# Patient Record
Sex: FEMALE | Race: WHITE | NOT HISPANIC OR LATINO
[De-identification: names, ages, dates, MRNs, and addresses within clinical notes are randomized per-mention and may not be internally consistent; named-entity substitution may affect disease eponyms.]

---

## 2017-01-10 ENCOUNTER — APPOINTMENT (OUTPATIENT)
Dept: GYNECOLOGIC ONCOLOGY | Facility: CLINIC | Age: 62
End: 2017-01-10

## 2017-01-10 VITALS
SYSTOLIC BLOOD PRESSURE: 170 MMHG | WEIGHT: 244 LBS | BODY MASS INDEX: 39.21 KG/M2 | DIASTOLIC BLOOD PRESSURE: 98 MMHG | HEIGHT: 66 IN | HEART RATE: 79 BPM

## 2017-07-11 ENCOUNTER — APPOINTMENT (OUTPATIENT)
Dept: GYNECOLOGIC ONCOLOGY | Facility: CLINIC | Age: 62
End: 2017-07-11

## 2017-07-11 VITALS
HEIGHT: 66 IN | BODY MASS INDEX: 39.86 KG/M2 | HEART RATE: 80 BPM | SYSTOLIC BLOOD PRESSURE: 166 MMHG | DIASTOLIC BLOOD PRESSURE: 86 MMHG | WEIGHT: 248 LBS

## 2017-11-17 ENCOUNTER — APPOINTMENT (OUTPATIENT)
Dept: GYNECOLOGIC ONCOLOGY | Facility: CLINIC | Age: 62
End: 2017-11-17
Payer: MEDICAID

## 2017-11-17 VITALS
BODY MASS INDEX: 40.18 KG/M2 | HEART RATE: 94 BPM | HEIGHT: 66 IN | WEIGHT: 250 LBS | DIASTOLIC BLOOD PRESSURE: 94 MMHG | SYSTOLIC BLOOD PRESSURE: 179 MMHG

## 2017-11-17 PROCEDURE — 99213 OFFICE O/P EST LOW 20 MIN: CPT

## 2017-12-18 ENCOUNTER — APPOINTMENT (OUTPATIENT)
Dept: INTERNAL MEDICINE | Facility: CLINIC | Age: 62
End: 2017-12-18
Payer: MEDICAID

## 2017-12-18 VITALS
DIASTOLIC BLOOD PRESSURE: 100 MMHG | OXYGEN SATURATION: 98 % | TEMPERATURE: 98 F | SYSTOLIC BLOOD PRESSURE: 190 MMHG | HEART RATE: 88 BPM

## 2017-12-18 VITALS — SYSTOLIC BLOOD PRESSURE: 166 MMHG | BODY MASS INDEX: 40.51 KG/M2 | WEIGHT: 251 LBS | DIASTOLIC BLOOD PRESSURE: 92 MMHG

## 2017-12-18 PROCEDURE — 99396 PREV VISIT EST AGE 40-64: CPT | Mod: 25

## 2017-12-18 PROCEDURE — 36415 COLL VENOUS BLD VENIPUNCTURE: CPT

## 2017-12-19 LAB
ALBUMIN SERPL ELPH-MCNC: 4.4 G/DL
ALP BLD-CCNC: 43 U/L
ALT SERPL-CCNC: 12 U/L
ANION GAP SERPL CALC-SCNC: 17 MMOL/L
APPEARANCE: CLEAR
AST SERPL-CCNC: 15 U/L
BASOPHILS # BLD AUTO: 0.02 K/UL
BASOPHILS NFR BLD AUTO: 0.3 %
BILIRUB SERPL-MCNC: 0.3 MG/DL
BILIRUBIN URINE: NEGATIVE
BLOOD URINE: NEGATIVE
BUN SERPL-MCNC: 22 MG/DL
CALCIUM SERPL-MCNC: 9.8 MG/DL
CHLORIDE SERPL-SCNC: 104 MMOL/L
CHOLEST SERPL-MCNC: 217 MG/DL
CHOLEST/HDLC SERPL: 3.7 RATIO
CO2 SERPL-SCNC: 22 MMOL/L
COLOR: YELLOW
CREAT SERPL-MCNC: 0.75 MG/DL
EOSINOPHIL # BLD AUTO: 0.08 K/UL
EOSINOPHIL NFR BLD AUTO: 1.1 %
GLUCOSE QUALITATIVE U: NEGATIVE MG/DL
GLUCOSE SERPL-MCNC: 85 MG/DL
HBA1C MFR BLD HPLC: 5.5 %
HCT VFR BLD CALC: 47.7 %
HDLC SERPL-MCNC: 59 MG/DL
HGB BLD-MCNC: 14.7 G/DL
IMM GRANULOCYTES NFR BLD AUTO: 0.1 %
KETONES URINE: ABNORMAL
LDLC SERPL CALC-MCNC: 141 MG/DL
LEUKOCYTE ESTERASE URINE: NEGATIVE
LYMPHOCYTES # BLD AUTO: 2 K/UL
LYMPHOCYTES NFR BLD AUTO: 27.3 %
MAN DIFF?: NORMAL
MCHC RBC-ENTMCNC: 28.3 PG
MCHC RBC-ENTMCNC: 30.8 GM/DL
MCV RBC AUTO: 91.9 FL
MONOCYTES # BLD AUTO: 0.46 K/UL
MONOCYTES NFR BLD AUTO: 6.3 %
NEUTROPHILS # BLD AUTO: 4.76 K/UL
NEUTROPHILS NFR BLD AUTO: 64.9 %
NITRITE URINE: NEGATIVE
PH URINE: 5
PLATELET # BLD AUTO: 236 K/UL
POTASSIUM SERPL-SCNC: 4.4 MMOL/L
PROT SERPL-MCNC: 7.8 G/DL
PROTEIN URINE: NEGATIVE MG/DL
RBC # BLD: 5.19 M/UL
RBC # FLD: 14.8 %
SODIUM SERPL-SCNC: 143 MMOL/L
SPECIFIC GRAVITY URINE: 1.03
TRIGL SERPL-MCNC: 84 MG/DL
TSH SERPL-ACNC: 1.92 UIU/ML
UROBILINOGEN URINE: NEGATIVE MG/DL
WBC # FLD AUTO: 7.33 K/UL

## 2018-05-15 ENCOUNTER — APPOINTMENT (OUTPATIENT)
Dept: GYNECOLOGIC ONCOLOGY | Facility: CLINIC | Age: 63
End: 2018-05-15
Payer: MEDICAID

## 2018-05-15 VITALS
HEART RATE: 92 BPM | DIASTOLIC BLOOD PRESSURE: 98 MMHG | BODY MASS INDEX: 40.5 KG/M2 | SYSTOLIC BLOOD PRESSURE: 172 MMHG | WEIGHT: 252 LBS | HEIGHT: 66 IN

## 2018-05-15 PROCEDURE — 99213 OFFICE O/P EST LOW 20 MIN: CPT

## 2018-05-16 ENCOUNTER — CLINICAL ADVICE (OUTPATIENT)
Age: 63
End: 2018-05-16

## 2018-05-25 ENCOUNTER — APPOINTMENT (OUTPATIENT)
Dept: GYNECOLOGIC ONCOLOGY | Facility: CLINIC | Age: 63
End: 2018-05-25

## 2018-11-02 ENCOUNTER — APPOINTMENT (OUTPATIENT)
Dept: GYNECOLOGIC ONCOLOGY | Facility: CLINIC | Age: 63
End: 2018-11-02
Payer: MEDICAID

## 2018-11-02 VITALS
SYSTOLIC BLOOD PRESSURE: 169 MMHG | BODY MASS INDEX: 40.66 KG/M2 | HEART RATE: 85 BPM | DIASTOLIC BLOOD PRESSURE: 93 MMHG | WEIGHT: 253 LBS | HEIGHT: 66 IN

## 2018-11-02 PROCEDURE — 99213 OFFICE O/P EST LOW 20 MIN: CPT

## 2018-12-21 ENCOUNTER — LABORATORY RESULT (OUTPATIENT)
Age: 63
End: 2018-12-21

## 2018-12-21 ENCOUNTER — APPOINTMENT (OUTPATIENT)
Dept: INTERNAL MEDICINE | Facility: CLINIC | Age: 63
End: 2018-12-21
Payer: MEDICAID

## 2018-12-21 VITALS
DIASTOLIC BLOOD PRESSURE: 80 MMHG | OXYGEN SATURATION: 98 % | SYSTOLIC BLOOD PRESSURE: 130 MMHG | WEIGHT: 254 LBS | HEART RATE: 96 BPM | HEIGHT: 66 IN | BODY MASS INDEX: 40.82 KG/M2 | TEMPERATURE: 99 F

## 2018-12-21 PROCEDURE — 99396 PREV VISIT EST AGE 40-64: CPT | Mod: 25

## 2018-12-21 PROCEDURE — 36415 COLL VENOUS BLD VENIPUNCTURE: CPT

## 2018-12-21 NOTE — HISTORY OF PRESENT ILLNESS
[FreeTextEntry1] : cpe [de-identified] : 1. Obesity does lots of physical activity and feels good; \par 2. Endometrial ca = follows with dr green, now 3y out and feeling fine. due for cxr\par 3. hm utd mammo,utd colo, declines all vaccines. no more paps. no fatigue\par 4. bp checks at home runs 110s/60s notes long hx white coat htn \par \par tough year as sister  and dog .

## 2018-12-21 NOTE — REVIEW OF SYSTEMS
[Fever] : no fever [Night Sweats] : no night sweats [Vision Problems] : no vision problems [Earache] : no earache [Chest Pain] : no chest pain [Shortness Of Breath] : no shortness of breath [Abdominal Pain] : no abdominal pain [Dysuria] : no dysuria [Skin Rash] : no skin rash [Headache] : no headache [Depression] : no depression

## 2018-12-21 NOTE — ASSESSMENT
[FreeTextEntry1] : 1. Obesity  discussed diet/exercise.\par 2. Endometrial ca = cxr; cont fu dr harman \par 3. hm utd mammo, due for colo, declines all vaccines. no more paps. \par 4. bp at goal at home\par 5. high hb rpt today, check epo level. \par 6. thyroid nodule - large - u/s, endocrine f/u  phone # and names given

## 2018-12-21 NOTE — PHYSICAL EXAM
[No Acute Distress] : no acute distress [Well Nourished] : well nourished [Well Developed] : well developed [Well-Appearing] : well-appearing [Normal Sclera/Conjunctiva] : normal sclera/conjunctiva [PERRL] : pupils equal round and reactive to light [EOMI] : extraocular movements intact [Normal Outer Ear/Nose] : the outer ears and nose were normal in appearance [Normal Oropharynx] : the oropharynx was normal [No JVD] : no jugular venous distention [Supple] : supple [No Lymphadenopathy] : no lymphadenopathy [No Respiratory Distress] : no respiratory distress  [Clear to Auscultation] : lungs were clear to auscultation bilaterally [No Accessory Muscle Use] : no accessory muscle use [Normal Rate] : normal rate  [Regular Rhythm] : with a regular rhythm [Normal S1, S2] : normal S1 and S2 [No Murmur] : no murmur heard [No Carotid Bruits] : no carotid bruits [No Abdominal Bruit] : a ~M bruit was not heard ~T in the abdomen [No Varicosities] : no varicosities [Pedal Pulses Present] : the pedal pulses are present [No Edema] : there was no peripheral edema [No Extremity Clubbing/Cyanosis] : no extremity clubbing/cyanosis [No Palpable Aorta] : no palpable aorta [Normal Appearance] : normal in appearance [No Nipple Discharge] : no nipple discharge [No Axillary Lymphadenopathy] : no axillary lymphadenopathy [Soft] : abdomen soft [Non Tender] : non-tender [Non-distended] : non-distended [No Masses] : no abdominal mass palpated [No HSM] : no HSM [Normal Bowel Sounds] : normal bowel sounds [Normal Supraclavicular Nodes] : no supraclavicular lymphadenopathy [Normal Axillary Nodes] : no axillary lymphadenopathy [Normal Posterior Cervical Nodes] : no posterior cervical lymphadenopathy [Normal Anterior Cervical Nodes] : no anterior cervical lymphadenopathy [No CVA Tenderness] : no CVA  tenderness [No Spinal Tenderness] : no spinal tenderness [No Joint Swelling] : no joint swelling [Grossly Normal Strength/Tone] : grossly normal strength/tone [No Rash] : no rash [Normal Gait] : normal gait [Coordination Grossly Intact] : coordination grossly intact [No Focal Deficits] : no focal deficits [Deep Tendon Reflexes (DTR)] : deep tendon reflexes were 2+ and symmetric [Normal Affect] : the affect was normal [Normal Mood] : the mood was normal [Normal Insight/Judgement] : insight and judgment were intact [de-identified] : 3cm R tyrhoid nodule

## 2018-12-24 LAB
25(OH)D3 SERPL-MCNC: 14.2 NG/ML
ALBUMIN SERPL ELPH-MCNC: 4.4 G/DL
ALP BLD-CCNC: 43 U/L
ALT SERPL-CCNC: 16 U/L
ANION GAP SERPL CALC-SCNC: 12 MMOL/L
APPEARANCE: CLEAR
AST SERPL-CCNC: 19 U/L
BASOPHILS # BLD AUTO: 0.02 K/UL
BASOPHILS NFR BLD AUTO: 0.3 %
BILIRUB SERPL-MCNC: 0.3 MG/DL
BILIRUBIN URINE: NEGATIVE
BLOOD URINE: NEGATIVE
BUN SERPL-MCNC: 19 MG/DL
CALCIUM SERPL-MCNC: 9.4 MG/DL
CHLORIDE SERPL-SCNC: 108 MMOL/L
CHOLEST SERPL-MCNC: 207 MG/DL
CHOLEST/HDLC SERPL: 3.9 RATIO
CO2 SERPL-SCNC: 21 MMOL/L
COLOR: YELLOW
CREAT SERPL-MCNC: 0.61 MG/DL
EOSINOPHIL # BLD AUTO: 0.06 K/UL
EOSINOPHIL NFR BLD AUTO: 0.8 %
GLUCOSE QUALITATIVE U: NEGATIVE MG/DL
GLUCOSE SERPL-MCNC: 99 MG/DL
HBA1C MFR BLD HPLC: 5.8 %
HCT VFR BLD CALC: 45.4 %
HDLC SERPL-MCNC: 53 MG/DL
HGB BLD-MCNC: 14 G/DL
IMM GRANULOCYTES NFR BLD AUTO: 0.1 %
KETONES URINE: NEGATIVE
LDLC SERPL CALC-MCNC: 130 MG/DL
LEUKOCYTE ESTERASE URINE: ABNORMAL
LYMPHOCYTES # BLD AUTO: 2.17 K/UL
LYMPHOCYTES NFR BLD AUTO: 27.5 %
MAN DIFF?: NORMAL
MCHC RBC-ENTMCNC: 27.7 PG
MCHC RBC-ENTMCNC: 30.8 GM/DL
MCV RBC AUTO: 89.9 FL
MONOCYTES # BLD AUTO: 0.66 K/UL
MONOCYTES NFR BLD AUTO: 8.4 %
NEUTROPHILS # BLD AUTO: 4.98 K/UL
NEUTROPHILS NFR BLD AUTO: 62.9 %
NITRITE URINE: NEGATIVE
PH URINE: 5.5
PLATELET # BLD AUTO: 250 K/UL
POTASSIUM SERPL-SCNC: 4 MMOL/L
PROT SERPL-MCNC: 7.4 G/DL
PROTEIN URINE: NEGATIVE MG/DL
RBC # BLD: 5.05 M/UL
RBC # FLD: 15 %
SODIUM SERPL-SCNC: 141 MMOL/L
SPECIFIC GRAVITY URINE: 1.02
TRIGL SERPL-MCNC: 122 MG/DL
TSH SERPL-ACNC: 2.28 UIU/ML
UROBILINOGEN URINE: NEGATIVE MG/DL
WBC # FLD AUTO: 7.9 K/UL

## 2018-12-25 LAB — EPO SERPL-MCNC: 9.5 MIU/ML

## 2019-01-24 ENCOUNTER — APPOINTMENT (OUTPATIENT)
Dept: ENDOCRINOLOGY | Facility: CLINIC | Age: 64
End: 2019-01-24
Payer: MEDICAID

## 2019-01-24 PROCEDURE — 10005 FNA BX W/US GDN 1ST LES: CPT

## 2019-01-24 PROCEDURE — 10006 FNA BX W/US GDN EA ADDL: CPT

## 2019-01-24 PROCEDURE — 99205 OFFICE O/P NEW HI 60 MIN: CPT | Mod: 25

## 2019-01-24 NOTE — ASSESSMENT
[FreeTextEntry1] : Large left sided 6.29 cm nodule ( heterogenous architecture, lobe filling) and 2.23 cm right sided nodule s.p successful biopsy. Both nodules warranted biopsy. \par Dr. Lim confirmed adequacy\par \par -Will be called back with results\par -Saved for [facility] \par -Follow up  will be determined

## 2019-01-24 NOTE — PHYSICAL EXAM
[Alert] : alert [No Acute Distress] : no acute distress [Well Nourished] : well nourished [Well Developed] : well developed [Normal Sclera/Conjunctiva] : normal sclera/conjunctiva [PERRL] : pupils equal, round and reactive to light [EOMI] : extra ocular movement intact [No Proptosis] : no proptosis [Supple] : the neck was supple [No Respiratory Distress] : no respiratory distress [Normal Rate and Effort] : normal respiratory rhythm and effort [No Accessory Muscle Use] : no accessory muscle use [Clear to Auscultation] : lungs were clear to auscultation bilaterally [Normal Rate] : heart rate was normal  [Normal S1, S2] : normal S1 and S2 [Regular Rhythm] : with a regular rhythm [Normal Bowel Sounds] : normal bowel sounds [Not Tender] : non-tender [Soft] : abdomen soft [Not Distended] : not distended [Normal] : normal and non tender [No CVA Tenderness] : no ~M costovertebral angle tenderness [No Spinal Tenderness] : no spinal tenderness [Normal Gait] : normal gait [No Joint Swelling] : no joint swelling seen [No Clubbing, Cyanosis] : no clubbing  or cyanosis of the fingernails [Normal Strength/Tone] : muscle strength and tone were normal [No Rash] : no rash [No Skin Lesions] : no skin lesions [Normal Reflexes] : deep tendon reflexes were 2+ and symmetric [No Motor Deficits] : the motor exam was normal [No Tremors] : no tremors [Oriented x3] : oriented to person, place, and time [Normal Insight/Judgement] : insight and judgment were intact [Normal Affect] : the affect was normal [Normal Mood] : the mood was normal [Kyphosis] : no kyphosis present [Scoliosis] : scoliosis not present [Foot Ulcers] : no foot ulcers [Acne] : no acne [de-identified] : Large left side nodule

## 2019-01-24 NOTE — PROCEDURE
[Area of Mass: ______] : mass identified in the [unfilled] [Patient] : the patient [Risks] : risks [Benefits] : benefits [Consent Obtained] : written consent was obtained prior to the procedure and is detailed in the patient's record [Alcohol] : alcohol [25 gauge 1.5 inch] : A 25 gauge 1.5 inch needle was used [Ultrasonic Guidance] : ultrasound guidance was employed [____ Passes] : [unfilled] passes were made through the mass [Sent to Histology] : the specimens were prepared in the usual manner and sent to cytopathologist [Tolerated Well] : the patient tolerated the procedure well [Vital Signs Stable] : the vital signs were stable [No Complications] : there were no complications [de-identified] : Saved for [facility]  [FreeTextEntry1] : Large left sided 6.29 cm nodule ( heterogenous architecture, lobe filling) and 2.23 cm right sided nodule s.p successful biopsy. Both nodules warranted biopsy. \par Dr. Lim confirmed adequacy\par \par -Will be called back with results\par -Saved for [facility] \par -Follow up  will be determined

## 2019-01-24 NOTE — HISTORY OF PRESENT ILLNESS
[FreeTextEntry1] : 63 year old female with history of uterine cancer, thyroid nodules here for evaluation \par \par She reported that she went to her PCP and she was found to have a large thyroid on palpation. She subsequently got a thyroid ultrasound.\par \par Thyroid ultrasound showing: \par Right: \par Nod # 1: 2.23 x 0.99 x 2.35 cm \par Nod # 2: 0.55 x 0.31 x 0.37 \par \par Left: \par Nod # 1: 6.29 x 3.85 x 3.97\par \par Last TSH on 12/21/2018: 2.28\par No dysphagia and dysphonia . Reported that when she lay on her side she may have some choking sensation \par No family history of thyroid cancer. No history of head or neck radiation. \par

## 2019-01-24 NOTE — REVIEW OF SYSTEMS
[Fatigue] : no fatigue [Decreased Appetite] : appetite not decreased [Recent Weight Gain (___ Lbs)] : no recent weight gain [Recent Weight Loss (___ Lbs)] : no recent weight loss [Visual Field Defect] : no visual field defect [Blurry Vision] : no blurred vision [Dry Eyes] : no dryness of the eyes [Dysphagia] : no dysphagia [Dysphonia] : no dysphonia [Neck Pain] : no neck pain [Nasal Congestion] : no nasal congestion [Chest Pain] : no chest pain [Palpitations] : no palpitations [Heart Rate Is Slow] : the heart rate was not slow [Heart Rate Is Fast] : the heart rate was not fast [Shortness Of Breath] : no shortness of breath [Wheezing] : no wheezing was heard [Cough] : no cough [SOB on Exertion] : no shortness of breath during exertion [Nausea] : no nausea [Vomiting] : no vomiting was observed [Constipation] : no constipation [Diarrhea] : no diarrhea [Polyuria] : no polyuria [Irregular Menses] : regular menses [Joint Pain] : no joint pain [Joint Stiffness] : no joint stiffness [Muscle Weakness] : no muscle weakness [Muscle Cramps] : no muscle cramps [Acanthosis] : no acanthosis  [Hirsutism] : no hirsutism [Acne] : no acne [Hair Loss] : no hair loss [Headache] : no headaches [Tremors] : no tremors [Dizziness] : no dizziness [Pain/Numbness of Digits] : no pain/numbness of digits [Depression] : no depression [Anxiety] : no anxiety [Polydipsia] : no polydipsia [Galactorrhea] : no galactorrhea  [Cold Intolerance] : cold tolerant [Heat Intolerance] : heat tolerant [Easy Bleeding] : no ~M tendency for easy bleeding [Easy Bruising] : no tendency for easy bruising [Swelling] : no swelling

## 2019-01-24 NOTE — PHYSICAL EXAM
[Alert] : alert [No Acute Distress] : no acute distress [Well Nourished] : well nourished [Well Developed] : well developed [Normal Sclera/Conjunctiva] : normal sclera/conjunctiva [PERRL] : pupils equal, round and reactive to light [EOMI] : extra ocular movement intact [No Proptosis] : no proptosis [Supple] : the neck was supple [No Respiratory Distress] : no respiratory distress [Normal Rate and Effort] : normal respiratory rhythm and effort [No Accessory Muscle Use] : no accessory muscle use [Clear to Auscultation] : lungs were clear to auscultation bilaterally [Normal Rate] : heart rate was normal  [Normal S1, S2] : normal S1 and S2 [Regular Rhythm] : with a regular rhythm [Normal Bowel Sounds] : normal bowel sounds [Not Tender] : non-tender [Soft] : abdomen soft [Not Distended] : not distended [Normal] : normal and non tender [No CVA Tenderness] : no ~M costovertebral angle tenderness [No Spinal Tenderness] : no spinal tenderness [Normal Gait] : normal gait [No Joint Swelling] : no joint swelling seen [No Clubbing, Cyanosis] : no clubbing  or cyanosis of the fingernails [Normal Strength/Tone] : muscle strength and tone were normal [No Rash] : no rash [No Skin Lesions] : no skin lesions [Normal Reflexes] : deep tendon reflexes were 2+ and symmetric [No Motor Deficits] : the motor exam was normal [No Tremors] : no tremors [Oriented x3] : oriented to person, place, and time [Normal Insight/Judgement] : insight and judgment were intact [Normal Affect] : the affect was normal [Normal Mood] : the mood was normal [Kyphosis] : no kyphosis present [Scoliosis] : scoliosis not present [Foot Ulcers] : no foot ulcers [Acne] : no acne [de-identified] : Large left side nodule

## 2019-01-24 NOTE — PROCEDURE
[Area of Mass: ______] : mass identified in the [unfilled] [Patient] : the patient [Risks] : risks [Benefits] : benefits [Consent Obtained] : written consent was obtained prior to the procedure and is detailed in the patient's record [Alcohol] : alcohol [25 gauge 1.5 inch] : A 25 gauge 1.5 inch needle was used [Ultrasonic Guidance] : ultrasound guidance was employed [____ Passes] : [unfilled] passes were made through the mass [Sent to Histology] : the specimens were prepared in the usual manner and sent to cytopathologist [Tolerated Well] : the patient tolerated the procedure well [Vital Signs Stable] : the vital signs were stable [No Complications] : there were no complications [de-identified] : Saved for [facility]  [FreeTextEntry1] : Large left sided 6.29 cm nodule ( heterogenous architecture, lobe filling) and 2.23 cm right sided nodule s.p successful biopsy. Both nodules warranted biopsy. \par Dr. Lim confirmed adequacy\par \par -Will be called back with results\par -Saved for [facility] \par -Follow up  will be determined

## 2019-01-25 VITALS
OXYGEN SATURATION: 98 % | BODY MASS INDEX: 39.87 KG/M2 | HEART RATE: 103 BPM | WEIGHT: 254 LBS | HEIGHT: 67 IN | SYSTOLIC BLOOD PRESSURE: 128 MMHG | DIASTOLIC BLOOD PRESSURE: 80 MMHG

## 2019-01-28 ENCOUNTER — APPOINTMENT (OUTPATIENT)
Dept: ENDOCRINOLOGY | Facility: CLINIC | Age: 64
End: 2019-01-28

## 2019-05-07 ENCOUNTER — APPOINTMENT (OUTPATIENT)
Dept: GYNECOLOGIC ONCOLOGY | Facility: CLINIC | Age: 64
End: 2019-05-07
Payer: MEDICAID

## 2019-05-07 VITALS — DIASTOLIC BLOOD PRESSURE: 97 MMHG | HEART RATE: 82 BPM | SYSTOLIC BLOOD PRESSURE: 176 MMHG

## 2019-05-07 VITALS
SYSTOLIC BLOOD PRESSURE: 187 MMHG | HEIGHT: 67 IN | WEIGHT: 262.38 LBS | DIASTOLIC BLOOD PRESSURE: 119 MMHG | BODY MASS INDEX: 41.18 KG/M2 | HEART RATE: 78 BPM

## 2019-05-07 PROCEDURE — 99213 OFFICE O/P EST LOW 20 MIN: CPT

## 2019-07-29 ENCOUNTER — APPOINTMENT (OUTPATIENT)
Dept: ENDOCRINOLOGY | Facility: CLINIC | Age: 64
End: 2019-07-29
Payer: MEDICAID

## 2019-07-29 VITALS
BODY MASS INDEX: 41.75 KG/M2 | SYSTOLIC BLOOD PRESSURE: 130 MMHG | DIASTOLIC BLOOD PRESSURE: 66 MMHG | OXYGEN SATURATION: 97 % | HEIGHT: 67 IN | WEIGHT: 266 LBS | HEART RATE: 73 BPM

## 2019-07-29 PROCEDURE — 99213 OFFICE O/P EST LOW 20 MIN: CPT | Mod: 25

## 2019-07-29 PROCEDURE — 76536 US EXAM OF HEAD AND NECK: CPT

## 2019-07-29 NOTE — REVIEW OF SYSTEMS
[Negative] : Musculoskeletal [Fever] : no fever [Chills] : no chills [Lower Ext Edema] : no lower extremity edema [Fatigue] : no fatigue [Decreased Appetite] : appetite not decreased [Visual Field Defect] : no visual field defect [Blurry Vision] : no blurred vision [Dysphagia] : no dysphagia [Palpitations] : no palpitations [Neck Pain] : no neck pain [Chest Pain] : no chest pain [Shortness Of Breath] : no shortness of breath [Cough] : no cough [Nausea] : no nausea [Vomiting] : no vomiting was observed [Constipation] : no constipation [Polyuria] : no polyuria [Dysuria] : no dysuria [Diarrhea] : no diarrhea [Polydipsia] : no polydipsia [Muscle Weakness] : no muscle weakness [Joint Pain] : no joint pain [Cold Intolerance] : cold tolerant [Heat Intolerance] : heat tolerant

## 2019-07-29 NOTE — REVIEW OF SYSTEMS
[Negative] : Musculoskeletal [Fever] : no fever [Chills] : no chills [Lower Ext Edema] : no lower extremity edema [Fatigue] : no fatigue [Visual Field Defect] : no visual field defect [Decreased Appetite] : appetite not decreased [Blurry Vision] : no blurred vision [Dysphagia] : no dysphagia [Palpitations] : no palpitations [Chest Pain] : no chest pain [Neck Pain] : no neck pain [Nausea] : no nausea [Shortness Of Breath] : no shortness of breath [Cough] : no cough [Constipation] : no constipation [Vomiting] : no vomiting was observed [Diarrhea] : no diarrhea [Dysuria] : no dysuria [Polyuria] : no polyuria [Muscle Weakness] : no muscle weakness [Polydipsia] : no polydipsia [Joint Pain] : no joint pain [Cold Intolerance] : cold tolerant [Heat Intolerance] : heat tolerant

## 2019-07-29 NOTE — PROCEDURE
[[employer] e 2008 model, 10-12 MHz frequencies] : multiple real time longitudinal and transverse images were obtained using a high resolution ultrasound with a linear transducer, [employer] e 2008 model, 10-12 MHz frequencies. All measurements will be reported as longitudinal x david-posterior x transverse. [Thyroid Nodule] : thyroid nodule [] : a heterogeneous parenchyma  [Right Thyroid] : right [Mid] : mid pole there is a  [Solid] : solid [Hypoechoic] : hypoechoic nodule [Thin] : has a thin halo [No calcification] : no calcification [Peripheral and scant  internal vas] : peripheral and scant internal vascularity [Left Thyroid] : left [Heterogeneous] : heterogenous nodule [Mixed] : mixed [Ovoid] : ovoid in shape [Smooth] : smooth [No] : does not have a halo [Peripheral and scant  internal vascularity] : peripheral and scant internal vascularity [No calcifications] : no calcifications [3] : 3 [No abnormal lymph nodes are seen.] : no abnormal lymph nodes are seen [FreeTextEntry1] : 3.83 x 1.85 x 1.86 [FreeTextEntry5] : 5.2 x 4.13 x 3.93 [FreeTextEntry2] : 0.2 [FreeTextEntry3] : 1.89 x 1.22 x 2.01

## 2019-07-29 NOTE — PROCEDURE
[[employer] 2008 model, 10-12 MHz frequencies] : multiple real time longitudinal and transverse images were obtained using a high resolution ultrasound with a linear transducer, [employer] 2008 model, 10-12 MHz frequencies. All measurements will be reported as longitudinal x david-posterior x transverse. [Thyroid Nodule] : thyroid nodule [] : a heterogeneous parenchyma  [Right Thyroid] : right [Mid] : mid pole there is a  [Solid] : solid [Hypoechoic] : hypoechoic nodule [Thin] : has a thin halo [No calcification] : no calcification [Peripheral and scant  internal vas] : peripheral and scant internal vascularity [Left Thyroid] : left [Heterogeneous] : heterogenous nodule [Mixed] : mixed [Ovoid] : ovoid in shape [Smooth] : smooth [No] : does not have a halo [Peripheral and scant  internal vascularity] : peripheral and scant internal vascularity [No calcifications] : no calcifications [No abnormal lymph nodes are seen.] : no abnormal lymph nodes are seen [3] : 3 [FreeTextEntry1] : 3.83 x 1.85 x 1.86 [FreeTextEntry2] : 0.2 [FreeTextEntry5] : 5.2 x 4.13 x 3.93 [FreeTextEntry3] : 5.25 x 3.56 x 3.77

## 2019-07-29 NOTE — PHYSICAL EXAM
[Pedal Pulses Normal] : the pedal pulses are present [No Edema] : there was no peripheral edema [Soft] : abdomen soft [Not Distended] : not distended [No Skin Lesions] : no skin lesions [No Motor Deficits] : the motor exam was normal [No Tremors] : no tremors [Alert] : alert [No Acute Distress] : no acute distress [Well Nourished] : well nourished [Well Developed] : well developed [Normal Sclera/Conjunctiva] : normal sclera/conjunctiva [EOMI] : extra ocular movement intact [No Respiratory Distress] : no respiratory distress [Normal Rate and Effort] : normal respiratory rhythm and effort [Clear to Auscultation] : lungs were clear to auscultation bilaterally [Normal S1, S2] : normal S1 and S2 [Regular Rhythm] : with a regular rhythm [Not Tender] : non-tender [No Stigmata of Cushings Syndrome] : no stigmata of cushings syndrome [Normal Gait] : normal gait [No Rash] : no rash [Oriented x3] : oriented to person, place, and time [Normal Affect] : the affect was normal [Normal Mood] : the mood was normal [Foot Ulcers] : no foot ulcers [Acanthosis Nigricans] : no acanthosis nigricans [de-identified] : Large soft nontender left thyroid nodule, right smaller soft nontender nodule

## 2019-07-29 NOTE — ASSESSMENT
[FreeTextEntry1] : 64 yo female with history of endometrial adenocarcinoma and large left thyroid nodule and two smaller right thyroid nodules. Patient s/p biopsy in Jan 2019 of left and right nodules. Both nodules were found to be benign.\par \par Multinodular Goiter\par 12/ 2018 TSH 2.28\par As patient currently asymptomatic for thyroid disease, will repeat TSH in 6months\par 1/2019 biopsy of left and right nodules benign\par Repeat in office thyroid sono today showed stable right nodules and decrease in size of left thyroid nodule\par Repeat thyroid sono in 1 year at next visit\par If patient beginnings to experience symptoms, such as SOB or dysphagia recommend contact office sooner for appointment as she may require thyroidectomy \par \par RTC in 1 year\par \par Discussed with Dr Overton

## 2019-07-29 NOTE — HISTORY OF PRESENT ILLNESS
[FreeTextEntry1] : 64 yo F with history of endometrial adenocarcinoma and thyroid nodules presenting for follow up visit.\par Last visit was  6/2019.\par \par Since last visit patient denies weight changes, changes in appetite, constipation/diarrhea, skin changes., cold/heat intolerance, palpitations or fatigue. \par Reports in the past she had difficulty swallowing. Currently patient does not report dysphagia, food getting stuck in throat, neck pain or SOB. \par \par 1/2019 Thyroid sonogram showed \par right \par nodule #1: 2.23x 0.99x2.35 cm\par nodule #2: 0.55x 0.31x 0.37cm\par left \par Nodule: 6.29x 3.85x 3.97cm\par \par Thyroid sono performed today in office \par 7/29/19 Right ( one nodule visualized)\par 1.89x 1.22x 2.01cm\par Left \par 5.25x 3.56x 3.77cm

## 2019-07-29 NOTE — ASSESSMENT
[FreeTextEntry1] : 62 yo female with history of endometrial adenocarcinoma and large left thyroid nodule and two smaller right thyroid nodules. Patient s/p biopsy in Jan 2019 of left and right nodules. Both nodules were found to be benign.\par \par Multinodular Goiter\par 12/ 2018 TSH 2.28\par As patient currently asymptomatic for thyroid disease, will repeat TSH in 6months\par 1/2019 biopsy of left and right nodules benign\par Repeat in office thyroid sono today showed stable right nodules and decrease in size of left thyroid nodule\par Repeat thyroid sono in 1 year at next visit\par If patient beginnings to experience symptoms, such as SOB or dysphagia recommend contact office sooner for appointment as she may require thyroidectomy \par \par RTC in 1 year\par \par Discussed with Dr Overton

## 2019-07-29 NOTE — REVIEW OF SYSTEMS
[Negative] : Musculoskeletal [Fever] : no fever [Chills] : no chills [Lower Ext Edema] : no lower extremity edema [Fatigue] : no fatigue [Visual Field Defect] : no visual field defect [Decreased Appetite] : appetite not decreased [Blurry Vision] : no blurred vision [Dysphagia] : no dysphagia [Chest Pain] : no chest pain [Palpitations] : no palpitations [Neck Pain] : no neck pain [Cough] : no cough [Nausea] : no nausea [Shortness Of Breath] : no shortness of breath [Vomiting] : no vomiting was observed [Constipation] : no constipation [Dysuria] : no dysuria [Polyuria] : no polyuria [Diarrhea] : no diarrhea [Joint Pain] : no joint pain [Polydipsia] : no polydipsia [Muscle Weakness] : no muscle weakness [Cold Intolerance] : cold tolerant [Heat Intolerance] : heat tolerant

## 2019-07-29 NOTE — IMPRESSION
[FreeTextEntry1] : Two nodules on the right and left, stable  [FreeTextEntry2] : Follow up ultrasound in one year

## 2019-07-29 NOTE — PHYSICAL EXAM
[No Edema] : there was no peripheral edema [Pedal Pulses Normal] : the pedal pulses are present [Soft] : abdomen soft [Not Distended] : not distended [No Skin Lesions] : no skin lesions [No Tremors] : no tremors [No Motor Deficits] : the motor exam was normal [Alert] : alert [Well Nourished] : well nourished [No Acute Distress] : no acute distress [Well Developed] : well developed [Normal Sclera/Conjunctiva] : normal sclera/conjunctiva [EOMI] : extra ocular movement intact [No Respiratory Distress] : no respiratory distress [Normal Rate and Effort] : normal respiratory rhythm and effort [Clear to Auscultation] : lungs were clear to auscultation bilaterally [Normal S1, S2] : normal S1 and S2 [Regular Rhythm] : with a regular rhythm [Not Tender] : non-tender [No Stigmata of Cushings Syndrome] : no stigmata of cushings syndrome [Normal Gait] : normal gait [No Rash] : no rash [Oriented x3] : oriented to person, place, and time [Normal Affect] : the affect was normal [Normal Mood] : the mood was normal [Foot Ulcers] : no foot ulcers [Acanthosis Nigricans] : no acanthosis nigricans [de-identified] : Large soft nontender left thyroid nodule, right smaller soft nontender nodule

## 2019-07-29 NOTE — PHYSICAL EXAM
[No Edema] : there was no peripheral edema [Pedal Pulses Normal] : the pedal pulses are present [Soft] : abdomen soft [Not Distended] : not distended [No Skin Lesions] : no skin lesions [No Motor Deficits] : the motor exam was normal [No Tremors] : no tremors [Alert] : alert [Well Nourished] : well nourished [No Acute Distress] : no acute distress [Well Developed] : well developed [Normal Sclera/Conjunctiva] : normal sclera/conjunctiva [EOMI] : extra ocular movement intact [No Respiratory Distress] : no respiratory distress [Normal Rate and Effort] : normal respiratory rhythm and effort [Clear to Auscultation] : lungs were clear to auscultation bilaterally [Normal S1, S2] : normal S1 and S2 [Regular Rhythm] : with a regular rhythm [Not Tender] : non-tender [No Stigmata of Cushings Syndrome] : no stigmata of cushings syndrome [Normal Gait] : normal gait [No Rash] : no rash [Oriented x3] : oriented to person, place, and time [Normal Affect] : the affect was normal [Normal Mood] : the mood was normal [Foot Ulcers] : no foot ulcers [Acanthosis Nigricans] : no acanthosis nigricans [de-identified] : Large soft nontender left thyroid nodule, right smaller soft nontender nodule

## 2019-07-29 NOTE — PROCEDURE
[[employer] e 2008 model, 10-12 MHz frequencies] : multiple real time longitudinal and transverse images were obtained using a high resolution ultrasound with a linear transducer, [employer] e 2008 model, 10-12 MHz frequencies. All measurements will be reported as longitudinal x david-posterior x transverse. [Thyroid Nodule] : thyroid nodule [] : a heterogeneous parenchyma  [Right Thyroid] : right [Mid] : mid pole there is a  [Hypoechoic] : hypoechoic nodule [Solid] : solid [Thin] : has a thin halo [No calcification] : no calcification [Peripheral and scant  internal vas] : peripheral and scant internal vascularity [Left Thyroid] : left [Heterogeneous] : heterogenous nodule [Mixed] : mixed [Ovoid] : ovoid in shape [Smooth] : smooth [No] : does not have a halo [No calcifications] : no calcifications [Peripheral and scant  internal vascularity] : peripheral and scant internal vascularity [3] : 3 [No abnormal lymph nodes are seen.] : no abnormal lymph nodes are seen [FreeTextEntry1] : 3.83 x 1.85 x 1.86 [FreeTextEntry2] : 0.2 [FreeTextEntry5] : 5.2 x 4.13 x 3.93 [FreeTextEntry3] : 5.25 x 3.56 x 3.77

## 2019-11-12 ENCOUNTER — APPOINTMENT (OUTPATIENT)
Dept: GYNECOLOGIC ONCOLOGY | Facility: CLINIC | Age: 64
End: 2019-11-12
Payer: MEDICAID

## 2019-11-12 VITALS
DIASTOLIC BLOOD PRESSURE: 95 MMHG | WEIGHT: 258 LBS | SYSTOLIC BLOOD PRESSURE: 185 MMHG | BODY MASS INDEX: 40.49 KG/M2 | HEIGHT: 67 IN | HEART RATE: 90 BPM

## 2019-11-12 PROCEDURE — 99213 OFFICE O/P EST LOW 20 MIN: CPT

## 2020-01-06 ENCOUNTER — APPOINTMENT (OUTPATIENT)
Dept: INTERNAL MEDICINE | Facility: CLINIC | Age: 65
End: 2020-01-06
Payer: MEDICAID

## 2020-01-06 ENCOUNTER — LABORATORY RESULT (OUTPATIENT)
Age: 65
End: 2020-01-06

## 2020-01-06 VITALS — SYSTOLIC BLOOD PRESSURE: 148 MMHG | DIASTOLIC BLOOD PRESSURE: 96 MMHG

## 2020-01-06 VITALS
BODY MASS INDEX: 41.12 KG/M2 | DIASTOLIC BLOOD PRESSURE: 100 MMHG | TEMPERATURE: 98 F | WEIGHT: 262 LBS | HEART RATE: 76 BPM | OXYGEN SATURATION: 98 % | SYSTOLIC BLOOD PRESSURE: 150 MMHG | HEIGHT: 67 IN

## 2020-01-06 PROCEDURE — 99396 PREV VISIT EST AGE 40-64: CPT | Mod: 25

## 2020-01-06 PROCEDURE — 36415 COLL VENOUS BLD VENIPUNCTURE: CPT

## 2020-01-06 NOTE — HISTORY OF PRESENT ILLNESS
[FreeTextEntry1] : cpe \par \par father in law .  [de-identified] : 1. Obesity does lots of physical activity and feels good; \par 2. Endometrial ca = follows with dr green, now 4y out and feeling fine cxr normal\par 3. hm utd mammo,utd colo, declines all vaccines. no more paps. no fatigue. sleeps fine. eyes ears skin fine \par 4. bp checks at home runs 110s/60s notes long hx white coat htn exercises daily up to 2mi walking dog no cp sob edema\par 5. thyroid nodule utd endocrinologist no longer feels nodule due for f/u 1y. \par 10 point review of systems reviewed and negative except as above

## 2020-01-06 NOTE — PHYSICAL EXAM
[No Acute Distress] : no acute distress [Well Nourished] : well nourished [Well Developed] : well developed [Well-Appearing] : well-appearing [Normal Sclera/Conjunctiva] : normal sclera/conjunctiva [PERRL] : pupils equal round and reactive to light [EOMI] : extraocular movements intact [Normal Outer Ear/Nose] : the outer ears and nose were normal in appearance [Normal Oropharynx] : the oropharynx was normal [No JVD] : no jugular venous distention [No Lymphadenopathy] : no lymphadenopathy [Supple] : supple [Thyroid Normal, No Nodules] : the thyroid was normal and there were no nodules present [No Respiratory Distress] : no respiratory distress  [No Accessory Muscle Use] : no accessory muscle use [Clear to Auscultation] : lungs were clear to auscultation bilaterally [Normal Rate] : normal rate  [Regular Rhythm] : with a regular rhythm [Normal S1, S2] : normal S1 and S2 [No Murmur] : no murmur heard [No Carotid Bruits] : no carotid bruits [No Abdominal Bruit] : a ~M bruit was not heard ~T in the abdomen [Pedal Pulses Present] : the pedal pulses are present [No Edema] : there was no peripheral edema [No Palpable Aorta] : no palpable aorta [No Extremity Clubbing/Cyanosis] : no extremity clubbing/cyanosis [Soft] : abdomen soft [Non Tender] : non-tender [Non-distended] : non-distended [No HSM] : no HSM [No Masses] : no abdominal mass palpated [Normal Bowel Sounds] : normal bowel sounds [Normal Supraclavicular Nodes] : no supraclavicular lymphadenopathy [Normal Axillary Nodes] : no axillary lymphadenopathy [Normal Posterior Cervical Nodes] : no posterior cervical lymphadenopathy [Normal Anterior Cervical Nodes] : no anterior cervical lymphadenopathy [No CVA Tenderness] : no CVA  tenderness [No Joint Swelling] : no joint swelling [No Spinal Tenderness] : no spinal tenderness [Grossly Normal Strength/Tone] : grossly normal strength/tone [No Rash] : no rash [Coordination Grossly Intact] : coordination grossly intact [No Focal Deficits] : no focal deficits [Normal Gait] : normal gait [Deep Tendon Reflexes (DTR)] : deep tendon reflexes were 2+ and symmetric [Normal Affect] : the affect was normal [Normal Mood] : the mood was normal [Normal Insight/Judgement] : insight and judgment were intact [de-identified] : varicose vein R thigh [de-identified] : abd hernia mid lower abd nontender reducible. lap scars well healed.

## 2020-01-06 NOTE — ASSESSMENT
[FreeTextEntry1] : 1. Obesity cont healthy eating, exercise. \par 2. Endometrial ca = follows with dr green will fu 1y \par 3. hm utd mammo,utd colo, declines all vaccines. no more paps. no fatigue. hct lower last year will rpt today \par 4. bp white coat htn cont to monitor at home call if > 140/90\par 5. thyroid nodule cont fu endocrine

## 2020-01-07 LAB
25(OH)D3 SERPL-MCNC: 18 NG/ML
ALBUMIN SERPL ELPH-MCNC: 4 G/DL
ALP BLD-CCNC: 39 U/L
ALT SERPL-CCNC: 15 U/L
ANION GAP SERPL CALC-SCNC: 13 MMOL/L
AST SERPL-CCNC: 19 U/L
BASOPHILS # BLD AUTO: 0.05 K/UL
BASOPHILS NFR BLD AUTO: 0.7 %
BILIRUB SERPL-MCNC: 0.3 MG/DL
BUN SERPL-MCNC: 23 MG/DL
CALCIUM SERPL-MCNC: 9.4 MG/DL
CHLORIDE SERPL-SCNC: 106 MMOL/L
CHOLEST SERPL-MCNC: 202 MG/DL
CHOLEST/HDLC SERPL: 3.7 RATIO
CO2 SERPL-SCNC: 22 MMOL/L
CREAT SERPL-MCNC: 0.62 MG/DL
EOSINOPHIL # BLD AUTO: 0.09 K/UL
EOSINOPHIL NFR BLD AUTO: 1.3 %
ESTIMATED AVERAGE GLUCOSE: 123 MG/DL
GLUCOSE SERPL-MCNC: 104 MG/DL
HBA1C MFR BLD HPLC: 5.9 %
HCT VFR BLD CALC: 46.5 %
HDLC SERPL-MCNC: 54 MG/DL
HGB BLD-MCNC: 14.5 G/DL
IMM GRANULOCYTES NFR BLD AUTO: 0.3 %
LDLC SERPL CALC-MCNC: 132 MG/DL
LYMPHOCYTES # BLD AUTO: 1.86 K/UL
LYMPHOCYTES NFR BLD AUTO: 27.8 %
MAN DIFF?: NORMAL
MCHC RBC-ENTMCNC: 27.8 PG
MCHC RBC-ENTMCNC: 31.2 GM/DL
MCV RBC AUTO: 89.3 FL
MONOCYTES # BLD AUTO: 0.48 K/UL
MONOCYTES NFR BLD AUTO: 7.2 %
NEUTROPHILS # BLD AUTO: 4.2 K/UL
NEUTROPHILS NFR BLD AUTO: 62.7 %
PLATELET # BLD AUTO: 225 K/UL
POTASSIUM SERPL-SCNC: 4.6 MMOL/L
PROT SERPL-MCNC: 7.2 G/DL
RBC # BLD: 5.21 M/UL
RBC # FLD: 14.6 %
SODIUM SERPL-SCNC: 141 MMOL/L
TRIGL SERPL-MCNC: 82 MG/DL
TSH SERPL-ACNC: 2.61 UIU/ML
WBC # FLD AUTO: 6.7 K/UL

## 2020-01-08 ENCOUNTER — CLINICAL ADVICE (OUTPATIENT)
Age: 65
End: 2020-01-08

## 2020-05-19 ENCOUNTER — APPOINTMENT (OUTPATIENT)
Dept: GYNECOLOGIC ONCOLOGY | Facility: CLINIC | Age: 65
End: 2020-05-19

## 2020-09-02 ENCOUNTER — APPOINTMENT (OUTPATIENT)
Dept: ENDOCRINOLOGY | Facility: CLINIC | Age: 65
End: 2020-09-02

## 2020-09-03 ENCOUNTER — APPOINTMENT (OUTPATIENT)
Dept: INTERNAL MEDICINE | Facility: CLINIC | Age: 65
End: 2020-09-03
Payer: MEDICAID

## 2020-09-03 PROCEDURE — 99442: CPT

## 2020-09-17 ENCOUNTER — APPOINTMENT (OUTPATIENT)
Dept: GASTROENTEROLOGY | Facility: CLINIC | Age: 65
End: 2020-09-17
Payer: MEDICAID

## 2020-09-17 VITALS
SYSTOLIC BLOOD PRESSURE: 181 MMHG | TEMPERATURE: 97.7 F | WEIGHT: 252 LBS | HEIGHT: 67 IN | HEART RATE: 96 BPM | BODY MASS INDEX: 39.55 KG/M2 | DIASTOLIC BLOOD PRESSURE: 106 MMHG

## 2020-09-17 PROCEDURE — 99204 OFFICE O/P NEW MOD 45 MIN: CPT

## 2020-09-17 NOTE — REASON FOR VISIT
[Consultation] : a consultation visit [FreeTextEntry1] : diarrhea and blood in stool since August 2020.  Pt has been on bland, MEMO diet, which has helped.  Last CO was 2 years ago, which showed an adenoma.

## 2020-09-17 NOTE — ASSESSMENT
[FreeTextEntry1] : 1.  Diarrhea x 4 weeks with bleeding, improving.  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  Differential includes inflammatory diarrhea (IBD), diverticular disease, osmotic diarrhea (lactose, sorbitol or other nonabsorbed solute, Celiac disease, pancreatic insufficiency, SIBO), secretory diarrhea (bile-acid, microscopic colitis, villous adenoma) or endocrine (hyperthyroid, diabetes), functional (IBS).\par 2.  Tubular adenoma, diverticulosis, hemorrhoids seen on colonoscopy in November 2018.\par 3.  Obesity.\par 4.  Thyroid nodules.\par 5.  History of endometrial adenocarcinoma status post MYRTLE (no radiation or chemo).\par \par Recs:\par - Prior labs and colonoscopy reviewed.\par - Given that symptoms have been improving over the past 2 weeks, patient was advised to continue with low fiber, low residue diet and slowly advance diet.\par - If diarrhea recurs, check stool studies to rule out infection, inflammation.\par - Patient was advised to check CBC, ESR, CRP, celiac serologies- patient prefers to wait and see if symptoms persist before repeating labs which were unremarkable in January.\par - Differential diagnosis discussed with patient- if symptoms persist and stool studies and labs are unremarkable, colonoscopy will be necessary to rule out IBD and other colonic lesions.

## 2020-09-17 NOTE — REVIEW OF SYSTEMS
[Diarrhea] : diarrhea [Negative] : Heme/Lymph [As Noted in HPI] : as noted in HPI [FreeTextEntry7] : rectal bleeding

## 2020-09-17 NOTE — HISTORY OF PRESENT ILLNESS
[Heartburn] : denies heartburn [Nausea] : denies nausea [Vomiting] : denies vomiting [Diarrhea] : improved diarrhea [Constipation] : denies constipation [Yellow Skin Or Eyes (Jaundice)] : denies jaundice [Abdominal Pain] : denies abdominal pain [Abdominal Swelling] : denies abdominal swelling [Rectal Pain] : denies rectal pain [_________] : Performed [unfilled] [de-identified] : Vera presents to the office today for evaluation of diarrhea with bleeding.\par \par She reports that symptoms started August 8, 2020 when she started to have explosive, watery diarrhea for 5 days.  She reports that she had felt nauseous after eating tomatoes before the symptoms started.  She also reports that her stools had been "chunky" for 2 weeks before the diarrhea started.  After 5 days of diarrhea, she started to notice red blood in her stools which she attributed to external hemorrhoids with the diarrhea.  She was going to the bathroom 5-10 times a day.  Previously, she had one formed stool a day without bleeding.  She also felt bloating and borborygmi with foul-smelling flatulence.  After 2 weeks, she placed herself on a BRAT diet and reports that her diarrhea started to resolve.  However, if she tried to advance her diet (chicken, cheese, almond milk), the diarrhea would recur.  She has lost 10 pounds in the past month due to eating less.  Today was her best day, as she had a formed stool without any bleeding.  She denies any fever, vomiting, abdominal pain, nocturnal symptoms, or new medications.  She takes Advil infrequently.  Her mother had "colitis" which was treated with Imodium or Kaopectate.  The patient had two previous colonoscopies (2008, 11/2018).  On her last colonoscopy, a small tubular adenoma was removed, and she also had diverticulosis and hemorrhoids. [de-identified] : tubular adenoma, diverticulosis, hemorrhoids

## 2020-09-17 NOTE — PHYSICAL EXAM
[General Appearance - Alert] : alert [General Appearance - In No Acute Distress] : in no acute distress [Sclera] : the sclera and conjunctiva were normal [PERRL With Normal Accommodation] : pupils were equal in size, round, and reactive to light [Extraocular Movements] : extraocular movements were intact [Outer Ear] : the ears and nose were normal in appearance [Hearing Threshold Finger Rub Not [place]] : hearing was normal [Neck Appearance] : the appearance of the neck was normal [Neck Cervical Mass (___cm)] : no neck mass was observed [Auscultation Breath Sounds / Voice Sounds] : lungs were clear to auscultation bilaterally [Heart Rate And Rhythm] : heart rate was normal and rhythm regular [Heart Sounds] : normal S1 and S2 [Edema] : there was no peripheral edema [Bowel Sounds] : normal bowel sounds [Abdomen Soft] : soft [Abdomen Tenderness] : non-tender [] : no hepato-splenomegaly [Abdomen Mass (___ Cm)] : no abdominal mass palpated [Cervical Lymph Nodes Enlarged Anterior Bilaterally] : anterior cervical [Supraclavicular Lymph Nodes Enlarged Bilaterally] : supraclavicular [No CVA Tenderness] : no ~M costovertebral angle tenderness [Abnormal Walk] : normal gait [FreeTextEntry1] : mild ankle swelling [Skin Color & Pigmentation] : normal skin color and pigmentation [Skin Turgor] : normal skin turgor [No Focal Deficits] : no focal deficits [Oriented To Time, Place, And Person] : oriented to person, place, and time [Impaired Insight] : insight and judgment were intact

## 2020-09-29 LAB
BACTERIA STL CULT: NORMAL
C DIFF TOX GENS STL QL NAA+PROBE: NORMAL
CDIFF BY PCR: NOT DETECTED
DEPRECATED O AND P PREP STL: NORMAL
G LAMBLIA AG STL QL: NORMAL

## 2020-10-02 LAB — CALPROTECTIN FECAL: 2246 UG/G

## 2020-11-20 ENCOUNTER — APPOINTMENT (OUTPATIENT)
Dept: GYNECOLOGIC ONCOLOGY | Facility: CLINIC | Age: 65
End: 2020-11-20

## 2021-01-25 ENCOUNTER — APPOINTMENT (OUTPATIENT)
Dept: INTERNAL MEDICINE | Facility: CLINIC | Age: 66
End: 2021-01-25

## 2021-11-15 ENCOUNTER — APPOINTMENT (OUTPATIENT)
Dept: INTERNAL MEDICINE | Facility: CLINIC | Age: 66
End: 2021-11-15

## 2021-11-22 ENCOUNTER — APPOINTMENT (OUTPATIENT)
Dept: INTERNAL MEDICINE | Facility: CLINIC | Age: 66
End: 2021-11-22
Payer: MEDICAID

## 2021-11-22 PROCEDURE — 90750 HZV VACC RECOMBINANT IM: CPT

## 2021-11-22 PROCEDURE — 90471 IMMUNIZATION ADMIN: CPT

## 2022-04-01 ENCOUNTER — LABORATORY RESULT (OUTPATIENT)
Age: 67
End: 2022-04-01

## 2022-04-01 ENCOUNTER — APPOINTMENT (OUTPATIENT)
Dept: INTERNAL MEDICINE | Facility: CLINIC | Age: 67
End: 2022-04-01
Payer: MEDICAID

## 2022-04-01 VITALS
WEIGHT: 224 LBS | HEART RATE: 118 BPM | SYSTOLIC BLOOD PRESSURE: 168 MMHG | BODY MASS INDEX: 35.16 KG/M2 | OXYGEN SATURATION: 98 % | HEIGHT: 67 IN | TEMPERATURE: 98.5 F | DIASTOLIC BLOOD PRESSURE: 102 MMHG

## 2022-04-01 VITALS — HEART RATE: 95 BPM

## 2022-04-01 DIAGNOSIS — Z23 ENCOUNTER FOR IMMUNIZATION: ICD-10-CM

## 2022-04-01 PROCEDURE — 90750 HZV VACC RECOMBINANT IM: CPT

## 2022-04-01 PROCEDURE — 99396 PREV VISIT EST AGE 40-64: CPT | Mod: 25

## 2022-04-01 PROCEDURE — 99214 OFFICE O/P EST MOD 30 MIN: CPT | Mod: 25

## 2022-04-01 PROCEDURE — 90471 IMMUNIZATION ADMIN: CPT

## 2022-04-01 NOTE — HISTORY OF PRESENT ILLNESS
[de-identified] : 1. Poison ivy RLE imporving with calamine. started 3w ago. spends lots of time outsid.e also notes bug bites.\par 2. bloody diarrhea x 2y comes and goes. no pain. has lost 25lb. bms now formed. occ gas with a bit of diarrhea. saw gi who rec colo she plans to pursue this. she declines imaging as would prefer colo first. of note mom had colitis. no skin/joint problems other than poison ivy. \par 3. hm due for mammo,due for colo, utd covid plus booster. shingrix #2 today. no more paps.  sleeps fine. eyes ears skin fine \par 4. bp checks at home runs 110s/60s notes long hx white coat htn exercises daily up to 2mi walking dog no cp sob edema\par 5. thyroid nodule utd endocrinologist no longer feels nodule due for f/u 1y. \par 6. Obesity does lots of physical activity and feels good\par 7. Endometrial ca = last fu w dr green was 3y ago. would like to return for fu \par 10 point review of systems reviewed and negative except as above

## 2022-04-01 NOTE — ASSESSMENT
[FreeTextEntry1] : 1. Poison ivy RLE rec clobetasol, cont calamine prn, rec cut posion ivy on property\par 2. bloody diarrhea concerning for colitiis in light of fhx an chronicity will rpt stool studies rec fu for colo. given ca hx rec imaging for ca recurrence/strrictures she declines at present. will let dr valentino know. check labs today. \par 3. hm due for mammo,due for colo, utd covid plus booster. shingrix #2 today. no more paps.  sleeps fine. eyes ears skin fine \par 4. bp checks at home runs 110s/60s notes long hx white coat htn exercises daily up to 2mi walking dog no cp sob edema\par 5. thyroid nodule utd endocrinologist no longer feels nodule due for f/u \par 6. Obesity does lots of physical activity and feels good\par 7. Endometrial ca = last fu w dr green was 3y ago. would like to return for fu

## 2022-04-01 NOTE — PHYSICAL EXAM
[No Acute Distress] : no acute distress [Well Nourished] : well nourished [Well Developed] : well developed [Well-Appearing] : well-appearing [Normal Sclera/Conjunctiva] : normal sclera/conjunctiva [PERRL] : pupils equal round and reactive to light [EOMI] : extraocular movements intact [Normal Outer Ear/Nose] : the outer ears and nose were normal in appearance [Normal Oropharynx] : the oropharynx was normal [No JVD] : no jugular venous distention [No Lymphadenopathy] : no lymphadenopathy [Supple] : supple [Thyroid Normal, No Nodules] : the thyroid was normal and there were no nodules present [No Respiratory Distress] : no respiratory distress  [No Accessory Muscle Use] : no accessory muscle use [Clear to Auscultation] : lungs were clear to auscultation bilaterally [Normal Rate] : normal rate  [Regular Rhythm] : with a regular rhythm [Normal S1, S2] : normal S1 and S2 [No Murmur] : no murmur heard [No Carotid Bruits] : no carotid bruits [No Abdominal Bruit] : a ~M bruit was not heard ~T in the abdomen [No Varicosities] : no varicosities [Pedal Pulses Present] : the pedal pulses are present [No Edema] : there was no peripheral edema [No Palpable Aorta] : no palpable aorta [No Extremity Clubbing/Cyanosis] : no extremity clubbing/cyanosis [Soft] : abdomen soft [Non Tender] : non-tender [Non-distended] : non-distended [No HSM] : no HSM [Normal Bowel Sounds] : normal bowel sounds [No Stool to Guaiac] : no stool to guaiac [Normal Sphincter Tone] : normal sphincter tone [No Mass] : no mass [Normal Posterior Cervical Nodes] : no posterior cervical lymphadenopathy [Normal Anterior Cervical Nodes] : no anterior cervical lymphadenopathy [No CVA Tenderness] : no CVA  tenderness [No Spinal Tenderness] : no spinal tenderness [No Joint Swelling] : no joint swelling [Grossly Normal Strength/Tone] : grossly normal strength/tone [No Rash] : no rash [Coordination Grossly Intact] : coordination grossly intact [No Focal Deficits] : no focal deficits [Normal Gait] : normal gait [Deep Tendon Reflexes (DTR)] : deep tendon reflexes were 2+ and symmetric [Normal Affect] : the affect was normal [Normal Insight/Judgement] : insight and judgment were intact [Stool Occult Blood] : stool negative for occult blood [de-identified] : + lower abd nontender large midline hernia

## 2022-04-04 LAB
ALBUMIN SERPL ELPH-MCNC: 4 G/DL
ALP BLD-CCNC: 49 U/L
ALT SERPL-CCNC: 11 U/L
ANION GAP SERPL CALC-SCNC: 14 MMOL/L
AST SERPL-CCNC: 14 U/L
BASOPHILS # BLD AUTO: 0.13 K/UL
BASOPHILS NFR BLD AUTO: 1.3 %
BILIRUB SERPL-MCNC: 0.2 MG/DL
BUN SERPL-MCNC: 25 MG/DL
CALCIUM SERPL-MCNC: 9.5 MG/DL
CHLORIDE SERPL-SCNC: 104 MMOL/L
CHOLEST SERPL-MCNC: 180 MG/DL
CO2 SERPL-SCNC: 21 MMOL/L
CREAT SERPL-MCNC: 0.92 MG/DL
EGFR: 70 ML/MIN/1.73M2
EOSINOPHIL # BLD AUTO: 0.12 K/UL
EOSINOPHIL NFR BLD AUTO: 1.2 %
ESTIMATED AVERAGE GLUCOSE: 123 MG/DL
GI PCR PANEL, STOOL: NORMAL
GLUCOSE SERPL-MCNC: 100 MG/DL
HBA1C MFR BLD HPLC: 5.9 %
HCT VFR BLD CALC: 35.3 %
HDLC SERPL-MCNC: 49 MG/DL
HGB BLD-MCNC: 10 G/DL
IMM GRANULOCYTES NFR BLD AUTO: 0.5 %
LDLC SERPL CALC-MCNC: 111 MG/DL
LYMPHOCYTES # BLD AUTO: 1.65 K/UL
LYMPHOCYTES NFR BLD AUTO: 16.1 %
MAN DIFF?: NORMAL
MCHC RBC-ENTMCNC: 21.2 PG
MCHC RBC-ENTMCNC: 28.3 GM/DL
MCV RBC AUTO: 74.9 FL
MONOCYTES # BLD AUTO: 0.77 K/UL
MONOCYTES NFR BLD AUTO: 7.5 %
NEUTROPHILS # BLD AUTO: 7.53 K/UL
NEUTROPHILS NFR BLD AUTO: 73.4 %
NONHDLC SERPL-MCNC: 132 MG/DL
PLATELET # BLD AUTO: 447 K/UL
POTASSIUM SERPL-SCNC: 4.5 MMOL/L
PROT SERPL-MCNC: 7.7 G/DL
RBC # BLD: 4.71 M/UL
RBC # FLD: 19.5 %
SODIUM SERPL-SCNC: 139 MMOL/L
TRIGL SERPL-MCNC: 102 MG/DL
TSH SERPL-ACNC: 1.87 UIU/ML
VIT B12 SERPL-MCNC: 379 PG/ML
WBC # FLD AUTO: 10.25 K/UL

## 2022-04-06 ENCOUNTER — APPOINTMENT (OUTPATIENT)
Dept: GASTROENTEROLOGY | Facility: CLINIC | Age: 67
End: 2022-04-06
Payer: MEDICAID

## 2022-04-06 VITALS
WEIGHT: 224 LBS | HEART RATE: 98 BPM | DIASTOLIC BLOOD PRESSURE: 98 MMHG | BODY MASS INDEX: 35.16 KG/M2 | SYSTOLIC BLOOD PRESSURE: 163 MMHG | HEIGHT: 67 IN

## 2022-04-06 DIAGNOSIS — Z01.818 ENCOUNTER FOR OTHER PREPROCEDURAL EXAMINATION: ICD-10-CM

## 2022-04-06 LAB
BACTERIA STL CULT: NORMAL
BAKER'S YEAST AB QL: 7 UNITS
BAKER'S YEAST IGA QL IA: 6.2 UNITS
BAKER'S YEAST IGA QN IA: NEGATIVE
BAKER'S YEAST IGG QN IA: NEGATIVE
DEPRECATED O AND P PREP STL: NORMAL
GLIADIN IGA SER QL: <5 UNITS
GLIADIN IGG SER QL: <5 UNITS
GLIADIN PEPTIDE IGA SER-ACNC: NEGATIVE
GLIADIN PEPTIDE IGG SER-ACNC: NEGATIVE
TTG IGA SER IA-ACNC: <1.2 U/ML
TTG IGA SER-ACNC: NEGATIVE
TTG IGG SER IA-ACNC: 1.5 U/ML
TTG IGG SER IA-ACNC: NEGATIVE

## 2022-04-06 PROCEDURE — 99214 OFFICE O/P EST MOD 30 MIN: CPT

## 2022-04-06 RX ORDER — CLOBETASOL PROPIONATE 0.5 MG/G
0.05 CREAM TOPICAL TWICE DAILY
Qty: 1 | Refills: 1 | Status: DISCONTINUED | COMMUNITY
Start: 2022-04-01 | End: 2022-04-06

## 2022-04-06 NOTE — ASSESSMENT
[FreeTextEntry1] : 1.  Diarrhea with bleeding since September 2021.  Family history of colitis (mother).  Previously elevated calprotectin, now with pANCA.  Likely IBD, suspicion for ulcerative colitis.\par 2.  New anemia, likely due to IBD.  Less likely NSAID enteropathy, peptic ulcer.\par 3.  Tubular adenoma, diverticulosis, hemorrhoids seen on colonoscopy in November 2018.\par 4.  Obesity.\par 5.  Thyroid nodules.\par 6.  History of endometrial adenocarcinoma status post MYRTLE (no radiation or chemo).\par 7.  Large abdominal hernia.\par \par Recs:\par - Recent labs reviewed.\par - Low fiber diet.\par - Patient was advised to discontinue NSAIDs.\par - Patient was advised to undergo colonoscopy, with possible endoscopy to follow if no signs of IBD seen in colon- procedure, risks, benefits, and alternatives were explained. Patient agreeable. Brochure given. Miralax prep.  Patient was advised to stop iron one week before procedures.

## 2022-04-06 NOTE — REVIEW OF SYSTEMS
[Recent Weight Loss (___ Lbs)] : recent [unfilled] ~Ulb weight loss [Diarrhea] : diarrhea [Negative] : Heme/Lymph [FreeTextEntry7] : Bleeding [FreeTextEntry1] : Pt is anemic, pt possible IBD as per pcp

## 2022-04-06 NOTE — HISTORY OF PRESENT ILLNESS
[Heartburn] : denies heartburn [Nausea] : denies nausea [Vomiting] : denies vomiting [Constipation] : denies constipation [Yellow Skin Or Eyes (Jaundice)] : denies jaundice [Abdominal Swelling] : denies abdominal swelling [Rectal Pain] : denies rectal pain [_________] : Performed [unfilled] [Diarrhea] : stable diarrhea [Abdominal Pain] : stable abdominal pain [de-identified] : Vera presents for follow up of diarrhea with bleeding.  She was last seen in the office in September 2020.\par \par In 2020, the patient was seen in the office for diarrhea with intermittent bleeding that lasted for about 4-6 weeks.  She submitted stool samples which were negative for infections but her calprotectin was elevated (2246).  After submitting the specimens, her GI symptoms resolved and she had normal bowel movements for about one year.\par \par On September 17, 2021, she started to have diarrhea with blood and mucus during the day and night.  At worse, she was having 20 BMs in a day.  Her stools have been gritty or marce and other times it seems the food will come out undigested.  She has also felt bloated with pelvic pressure.  Symptoms are worse when she lies on her left side.  She has been taking Imodium with simethicone at bedtime and her symptoms have improved.  She has also avoided eating later in the day.  She denies any upper GI symptoms, including upper abdominal pain, dysphagia, or nausea.  She has lost 45 lbs since September 2021.  On her most recent labs, she was noted to have new anemia (Hb 10) with an elevated pANCA.\par \par She continues to take Advil infrequently.  Her mother had "colitis" which was treated with Imodium or Kaopectate.  Her sister had lupus.  The patient had two previous colonoscopies (2008, 11/2018).  On her last colonoscopy, a small tubular adenoma was removed, and she also had diverticulosis and hemorrhoids. [de-identified] : tubular adenoma, diverticulosis, hemorrhoids

## 2022-04-06 NOTE — PHYSICAL EXAM
[General Appearance - Alert] : alert [General Appearance - In No Acute Distress] : in no acute distress [Sclera] : the sclera and conjunctiva were normal [PERRL With Normal Accommodation] : pupils were equal in size, round, and reactive to light [Extraocular Movements] : extraocular movements were intact [Outer Ear] : the ears and nose were normal in appearance [Hearing Threshold Finger Rub Not [place]] : hearing was normal [Neck Appearance] : the appearance of the neck was normal [Neck Cervical Mass (___cm)] : no neck mass was observed [Auscultation Breath Sounds / Voice Sounds] : lungs were clear to auscultation bilaterally [Heart Rate And Rhythm] : heart rate was normal and rhythm regular [Heart Sounds] : normal S1 and S2 [Edema] : there was no peripheral edema [Bowel Sounds] : normal bowel sounds [Abdomen Soft] : soft [Abdomen Tenderness] : non-tender [] : no hepato-splenomegaly [Abdomen Mass (___ Cm)] : no abdominal mass palpated [Cervical Lymph Nodes Enlarged Anterior Bilaterally] : anterior cervical [Supraclavicular Lymph Nodes Enlarged Bilaterally] : supraclavicular [No CVA Tenderness] : no ~M costovertebral angle tenderness [Abnormal Walk] : normal gait [Skin Color & Pigmentation] : normal skin color and pigmentation [Skin Turgor] : normal skin turgor [No Focal Deficits] : no focal deficits [Oriented To Time, Place, And Person] : oriented to person, place, and time [Impaired Insight] : insight and judgment were intact [FreeTextEntry1] : erythema of right lower extremity

## 2022-04-07 LAB
ENDOMYSIUM IGA SER QL: NEGATIVE
ENDOMYSIUM IGA TITR SER: NORMAL

## 2022-04-10 ENCOUNTER — NON-APPOINTMENT (OUTPATIENT)
Age: 67
End: 2022-04-10

## 2022-04-11 ENCOUNTER — LABORATORY RESULT (OUTPATIENT)
Age: 67
End: 2022-04-11

## 2022-04-11 ENCOUNTER — APPOINTMENT (OUTPATIENT)
Dept: GASTROENTEROLOGY | Facility: CLINIC | Age: 67
End: 2022-04-11
Payer: MEDICAID

## 2022-04-11 DIAGNOSIS — K29.70 GASTRITIS, UNSPECIFIED, W/OUT BLEEDING: ICD-10-CM

## 2022-04-11 LAB — SARS-COV-2 N GENE NPH QL NAA+PROBE: NOT DETECTED

## 2022-04-11 PROCEDURE — 45380 COLONOSCOPY AND BIOPSY: CPT

## 2022-04-11 PROCEDURE — 43239 EGD BIOPSY SINGLE/MULTIPLE: CPT | Mod: 59

## 2022-04-11 RX ORDER — OMEPRAZOLE 20 MG/1
20 CAPSULE, DELAYED RELEASE ORAL
Qty: 90 | Refills: 3 | Status: ACTIVE | COMMUNITY
Start: 2022-04-11 | End: 1900-01-01

## 2022-04-14 ENCOUNTER — NON-APPOINTMENT (OUTPATIENT)
Age: 67
End: 2022-04-14

## 2022-04-21 ENCOUNTER — NON-APPOINTMENT (OUTPATIENT)
Age: 67
End: 2022-04-21

## 2022-04-25 ENCOUNTER — APPOINTMENT (OUTPATIENT)
Dept: GASTROENTEROLOGY | Facility: CLINIC | Age: 67
End: 2022-04-25
Payer: MEDICAID

## 2022-04-25 VITALS
HEART RATE: 96 BPM | BODY MASS INDEX: 35.16 KG/M2 | DIASTOLIC BLOOD PRESSURE: 96 MMHG | SYSTOLIC BLOOD PRESSURE: 188 MMHG | HEIGHT: 67 IN | WEIGHT: 224 LBS

## 2022-04-25 DIAGNOSIS — K62.5 HEMORRHAGE OF ANUS AND RECTUM: ICD-10-CM

## 2022-04-25 DIAGNOSIS — R19.7 DIARRHEA, UNSPECIFIED: ICD-10-CM

## 2022-04-25 PROCEDURE — 99214 OFFICE O/P EST MOD 30 MIN: CPT

## 2022-04-26 PROBLEM — K62.5 BRIGHT RED RECTAL BLEEDING: Noted: 2020-09-03

## 2022-04-26 PROBLEM — R19.7 BLOODY DIARRHEA: Noted: 2020-09-17

## 2022-04-26 NOTE — PHYSICAL EXAM
[General Appearance - Alert] : alert [General Appearance - In No Acute Distress] : in no acute distress [Sclera] : the sclera and conjunctiva were normal [PERRL With Normal Accommodation] : pupils were equal in size, round, and reactive to light [Extraocular Movements] : extraocular movements were intact [Outer Ear] : the ears and nose were normal in appearance [Hearing Threshold Finger Rub Not [place]] : hearing was normal [Neck Appearance] : the appearance of the neck was normal [Neck Cervical Mass (___cm)] : no neck mass was observed [Auscultation Breath Sounds / Voice Sounds] : lungs were clear to auscultation bilaterally [Heart Rate And Rhythm] : heart rate was normal and rhythm regular [Heart Sounds] : normal S1 and S2 [Edema] : there was no peripheral edema [Bowel Sounds] : normal bowel sounds [Abdomen Soft] : soft [Abdomen Tenderness] : non-tender [] : no hepato-splenomegaly [Abdomen Mass (___ Cm)] : no abdominal mass palpated [Cervical Lymph Nodes Enlarged Anterior Bilaterally] : anterior cervical [Supraclavicular Lymph Nodes Enlarged Bilaterally] : supraclavicular [No CVA Tenderness] : no ~M costovertebral angle tenderness [Abnormal Walk] : normal gait [Skin Color & Pigmentation] : normal skin color and pigmentation [Skin Turgor] : normal skin turgor [No Focal Deficits] : no focal deficits [Oriented To Time, Place, And Person] : oriented to person, place, and time [Impaired Insight] : insight and judgment were intact [FreeTextEntry1] : erythema of right lower extremity

## 2022-04-26 NOTE — HISTORY OF PRESENT ILLNESS
[Heartburn] : denies heartburn [Nausea] : denies nausea [Vomiting] : denies vomiting [Diarrhea] : stable diarrhea [Constipation] : denies constipation [Yellow Skin Or Eyes (Jaundice)] : denies jaundice [Abdominal Pain] : stable abdominal pain [Abdominal Swelling] : denies abdominal swelling [Rectal Pain] : denies rectal pain [_________] : Performed [unfilled] [de-identified] : Vera presents for follow up after being diagnosed with ulcerative colitis on her colonoscopy 2 weeks prior with Dr. Orellana.\par \par The patient was seen earlier this month for anemia and diarrhea with rectal bleeding since September 2021.  She underwent a colonoscopy on April 11 which revealed pancolitis, diverticulosis, and hemorrhoids.  Biopsies revealed chronic active colitis.  She also underwent an EGD which showed a 2 cm hiatus hernia, GERD, and fundic polyps.  She denied any heartburn before the procedure but omeprazole has been helping with GI symptoms due to iron supplements.\par \par After her colonoscopy, the patient was started on mesalamine 0.375 mg (4 tabs) about 10 days ago.  She reports that her lower GI symptoms have significant improved.  Her diarrhea, nocturnal symptoms and bloating have resolved.  She is currently having 3 formed stools in the morning and one formed stool in the evening.  She sees specs of blood at times.  She still has occasional urgency but passes solid stool.  She is able to sleep through the night.  She has also noted that a rash on her right leg is improving.\par \par PMH: The patient was initially seen in 2020 for diarrhea with intermittent bleeding that lasted for about 4-6 weeks.  She submitted stool samples which were negative for infections but her calprotectin was elevated (2246).  After submitting the specimens, her GI symptoms resolved and she had normal bowel movements for about one year.  Symptoms recurred in September 2021 with frequent bloody diarrhea with mucus and urgency.  She had lost 45 lbs since September 2021.  On her labs, she was noted to have new anemia (Hb 10) with an elevated pANCA.\par \par Her mother had "colitis" which was treated with Imodium or Kaopectate.  Her sister had lupus.  The patient had colonoscopies in 2008 and 2018 with removal of a tubular adenoma. [de-identified] : ulcerative colitis, diverticulosis, hemorrhoids

## 2022-04-26 NOTE — REASON FOR VISIT
[Follow-Up: _____] : a [unfilled] follow-up visit [FreeTextEntry1] : follow up after colonoscopy- new ulcerative colitis dx

## 2022-04-26 NOTE — ASSESSMENT
[FreeTextEntry1] : 1.  Ulcerative colitis (pancolitis) diagnosed on colonoscopy in April 2022, improving on mesalamine.\par 2.  Anemia, likely due to ulcerative colitis.\par 3.  History of tubular adenoma, diverticulosis, hemorrhoids seen on colonoscopy in November 2018.\par 4.  Esophagitis, hiatus hernia, and peptic duodenitis seen on EGD in April 2022.\par 5.  Obesity.\par 6.  Thyroid nodules.\par 7.  History of endometrial adenocarcinoma status post MYRTLE (no radiation or chemo).\par 8.  Large abdominal hernia.\par \par Recs:\par - Colonoscopy results reviewed.\par - Diagnosis of ulcerative colitis, nature of disease, prognosis, and treatment extensively discussed.  Patient was advised of risk of recurrence off treatment.  She is currently responding well to mesalamine.  She was advised to continue mesalamine indefinitely.\par - Continue PPI while on iron supplements.  Repeat labs in 2-3 months.  Will wean PPI as Hb improves.\par - Follow up in 6 months.\par - Plan to repeat colonoscopy to evaluate for mucosal healing in 6-12 months.

## 2022-05-24 LAB
BASOPHILS # BLD AUTO: 0.11 K/UL
BASOPHILS NFR BLD AUTO: 1.3 %
EOSINOPHIL # BLD AUTO: 0.1 K/UL
EOSINOPHIL NFR BLD AUTO: 1.1 %
HCT VFR BLD CALC: 37 %
HGB BLD-MCNC: 10.8 G/DL
IMM GRANULOCYTES NFR BLD AUTO: 0.5 %
LYMPHOCYTES # BLD AUTO: 2.14 K/UL
LYMPHOCYTES NFR BLD AUTO: 24.5 %
MAN DIFF?: NORMAL
MCHC RBC-ENTMCNC: 21.7 PG
MCHC RBC-ENTMCNC: 29.2 GM/DL
MCV RBC AUTO: 74.4 FL
MONOCYTES # BLD AUTO: 0.77 K/UL
MONOCYTES NFR BLD AUTO: 8.8 %
NEUTROPHILS # BLD AUTO: 5.58 K/UL
NEUTROPHILS NFR BLD AUTO: 63.8 %
PLATELET # BLD AUTO: 446 K/UL
RBC # BLD: 4.97 M/UL
RBC # FLD: 19.9 %
WBC # FLD AUTO: 8.74 K/UL

## 2022-08-23 ENCOUNTER — NON-APPOINTMENT (OUTPATIENT)
Age: 67
End: 2022-08-23

## 2022-08-25 LAB
BASOPHILS # BLD AUTO: 0.11 K/UL
BASOPHILS NFR BLD AUTO: 1.6 %
EOSINOPHIL # BLD AUTO: 0.17 K/UL
EOSINOPHIL NFR BLD AUTO: 2.4 %
FERRITIN SERPL-MCNC: 36 NG/ML
HCT VFR BLD CALC: 40.6 %
HGB BLD-MCNC: 12.4 G/DL
IMM GRANULOCYTES NFR BLD AUTO: 0.3 %
IRON SATN MFR SERPL: 11 %
IRON SERPL-MCNC: 37 UG/DL
LYMPHOCYTES # BLD AUTO: 1.91 K/UL
LYMPHOCYTES NFR BLD AUTO: 27.1 %
MAN DIFF?: NORMAL
MCHC RBC-ENTMCNC: 25.4 PG
MCHC RBC-ENTMCNC: 30.5 GM/DL
MCV RBC AUTO: 83 FL
MONOCYTES # BLD AUTO: 0.65 K/UL
MONOCYTES NFR BLD AUTO: 9.2 %
NEUTROPHILS # BLD AUTO: 4.2 K/UL
NEUTROPHILS NFR BLD AUTO: 59.4 %
PLATELET # BLD AUTO: 338 K/UL
RBC # BLD: 4.89 M/UL
RBC # FLD: 18 %
TIBC SERPL-MCNC: 330 UG/DL
UIBC SERPL-MCNC: 293 UG/DL
VIT B12 SERPL-MCNC: 1580 PG/ML
WBC # FLD AUTO: 7.06 K/UL

## 2022-12-14 LAB
BASOPHILS # BLD AUTO: 0.09 K/UL
BASOPHILS NFR BLD AUTO: 1.2 %
EOSINOPHIL # BLD AUTO: 0.19 K/UL
EOSINOPHIL NFR BLD AUTO: 2.6 %
FERRITIN SERPL-MCNC: 51 NG/ML
HCT VFR BLD CALC: 41.1 %
HGB BLD-MCNC: 12.9 G/DL
IMM GRANULOCYTES NFR BLD AUTO: 0.3 %
IRON SATN MFR SERPL: 22 %
IRON SERPL-MCNC: 66 UG/DL
LYMPHOCYTES # BLD AUTO: 2.03 K/UL
LYMPHOCYTES NFR BLD AUTO: 27.4 %
MAN DIFF?: NORMAL
MCHC RBC-ENTMCNC: 27.9 PG
MCHC RBC-ENTMCNC: 31.4 GM/DL
MCV RBC AUTO: 89 FL
MONOCYTES # BLD AUTO: 0.62 K/UL
MONOCYTES NFR BLD AUTO: 8.4 %
NEUTROPHILS # BLD AUTO: 4.46 K/UL
NEUTROPHILS NFR BLD AUTO: 60.1 %
PLATELET # BLD AUTO: 321 K/UL
RBC # BLD: 4.62 M/UL
RBC # FLD: 15.2 %
TIBC SERPL-MCNC: 299 UG/DL
UIBC SERPL-MCNC: 234 UG/DL
VIT B12 SERPL-MCNC: 540 PG/ML
WBC # FLD AUTO: 7.41 K/UL

## 2023-05-16 ENCOUNTER — APPOINTMENT (OUTPATIENT)
Dept: GASTROENTEROLOGY | Facility: CLINIC | Age: 68
End: 2023-05-16
Payer: MEDICARE

## 2023-05-16 ENCOUNTER — NON-APPOINTMENT (OUTPATIENT)
Age: 68
End: 2023-05-16

## 2023-05-16 VITALS
WEIGHT: 234 LBS | BODY MASS INDEX: 36.73 KG/M2 | DIASTOLIC BLOOD PRESSURE: 93 MMHG | SYSTOLIC BLOOD PRESSURE: 153 MMHG | HEART RATE: 87 BPM | HEIGHT: 67 IN

## 2023-05-16 DIAGNOSIS — R21 RASH AND OTHER NONSPECIFIC SKIN ERUPTION: ICD-10-CM

## 2023-05-16 PROCEDURE — 99214 OFFICE O/P EST MOD 30 MIN: CPT

## 2023-05-16 PROCEDURE — 82274 ASSAY TEST FOR BLOOD FECAL: CPT | Mod: QW

## 2023-05-16 RX ORDER — CLOBETASOL PROPIONATE 0.5 MG/G
0.05 CREAM TOPICAL TWICE DAILY
Qty: 45 | Refills: 1 | Status: ACTIVE | COMMUNITY
Start: 2023-05-16 | End: 1900-01-01

## 2023-05-16 RX ORDER — ACETAMINOPHEN/DIPHENHYDRAMINE 500MG-25MG
1000 TABLET ORAL DAILY
Qty: 100 | Refills: 0 | Status: DISCONTINUED | COMMUNITY
Start: 2022-04-04 | End: 2023-05-16

## 2023-05-16 RX ORDER — LOPERAMIDE HYDROCHLORIDE AND SIMETHICONE 125; 2 MG/1; MG/1
2-125 TABLET ORAL TWICE DAILY
Qty: 60 | Refills: 2 | Status: DISCONTINUED | COMMUNITY
Start: 2022-04-06 | End: 2023-05-16

## 2023-05-16 RX ORDER — FERROUS SULFATE 325(65) MG
325 (65 FE) TABLET ORAL DAILY
Qty: 90 | Refills: 0 | Status: DISCONTINUED | COMMUNITY
Start: 2022-04-04 | End: 2023-05-16

## 2023-05-16 RX ORDER — IBUPROFEN 200 MG/1
TABLET, COATED ORAL
Refills: 0 | Status: DISCONTINUED | COMMUNITY
End: 2023-05-16

## 2023-05-16 NOTE — CONSULT LETTER
[Dear  ___] : Dear  [unfilled], [Consult Letter:] : I had the pleasure of evaluating your patient, [unfilled]. [( Thank you for referring [unfilled] for consultation for _____ )] : Thank you for referring [unfilled] for consultation for [unfilled] [Please see my note below.] : Please see my note below. [Consult Closing:] : Thank you very much for allowing me to participate in the care of this patient.  If you have any questions, please do not hesitate to contact me. [Sincerely,] : Sincerely, [FreeTextEntry3] : Julio C Orellana MD

## 2023-05-16 NOTE — ASSESSMENT
[FreeTextEntry1] : Assessment:\par 1. Ulcerative colitis (pancolitis) diagnosed on colonoscopy in April 2022-despite the fact that the patient is improved with regard to colitis, the mucopurulent stool may portend increased activity\par 2. Anemia, likely due to ulcerative colitis.\par 3. History of tubular adenoma, diverticulosis, hemorrhoids seen on colonoscopy in November 2018.\par 4. Esophagitis, hiatus hernia, and peptic duodenitis seen on EGD in April 2022.\par 5. Obesity.\par 6. Thyroid nodules.\par 7. History of endometrial adenocarcinoma status post MYRTLE (no radiation or chemo).\par 8. Large abdominal hernia.\par 9.  Bilateral lower extremity rash-May be related to contact dermatitis such as poison ivy, rule out secondary to colitis.\par \par Plan:\par 1.  Continue mesalamine 0.375 mg, #4 pills once daily.\par 2.  Start clobetasol cream 0.05% twice daily.\par 3.  If lower extremity rash does not improve within 2 weeks, the patient will see a dermatologist.\par 4.  If the patient's colitis becomes more active, she will need additional treatment.\par 5.  The patient will have a repeat colonoscopy with the MiraLax prep within 2 months, and Dr. Napoles is aware of this.

## 2023-05-16 NOTE — PHYSICAL EXAM
[Alert] : alert [Normal Voice/Communication] : normal voice/communication [Healthy Appearing] : healthy appearing [No Acute Distress] : no acute distress [Well Developed] : well developed [Obese (BMI >= 30)] : obese (BMI >= 30) [Sclera] : the sclera and conjunctiva were normal [Hearing Threshold Finger Rub Not [place]] : hearing was normal [Normal Lips/Gums] : the lips and gums were normal [Oropharynx] : the oropharynx was normal [No Neck Mass] : no neck mass was observed [No Respiratory Distress] : no respiratory distress [No Acc Muscle Use] : no accessory muscle use [Respiration, Rhythm And Depth] : normal respiratory rhythm and effort [Auscultation Breath Sounds / Voice Sounds] : lungs were clear to auscultation bilaterally [Heart Rate And Rhythm] : heart rate was normal and rhythm regular [Normal S1, S2] : normal S1 and S2 [Murmurs] : no murmurs [Heart Sounds Gallop] : no gallops [No Rubs] : no pericardial rub [None] : no edema [Bowel Sounds] : normal bowel sounds [Abdomen Tenderness] : non-tender [No Masses] : no abdominal mass palpated [Abdomen Soft] : soft [] : no hepatosplenomegaly [Ascites: ___] : no ascites [Rebound Tenderness] : no rebound tenderness [Ventral] : ventral [Normal Sphincter Tone] : normal sphincter tone [No External Hemorrhoid] : no external hemorrhoids [No Rectal Mass] : no rectal mass [Occult Blood] : positive occult blood [FIT Test] : positive FIT test [Cervical Lymph Nodes Enlarged Posterior Bilaterally] : no posterior cervical lymphadenopathy [Cervical Lymph Nodes Enlarged Anterior Bilaterally] : no anterior cervical lymphadenopathy [No CVA Tenderness] : no CVA  tenderness [No Spinal Tenderness] : no spinal tenderness [Involuntary Movements] : no involuntary movements were seen [No Joint Swelling] : no joint swelling seen [Normal Color / Pigmentation] : normal skin color and pigmentation [Normal Turgor] : normal skin turgor [Skin Lesions] : no skin lesions [No Focal Deficits] : no focal deficits [Oriented To Time, Place, And Person] : oriented to person, place, and time [Normal Affect] : the affect was normal [Normal Mood] : the mood was normal [de-identified] : Tympanic membranes intact bilaterally [de-identified] : Thyromegaly [de-identified] : Mucopurulent stool [de-identified] : Bilateral lower extremity rash-erythematous and urticarial in nature

## 2023-05-16 NOTE — HISTORY OF PRESENT ILLNESS
[FreeTextEntry1] : Vera feels much better since she started taking mesalamine.  She said her bowel movements are now normal, and she feels "like a person."  She showed me a picture of her stool, and it appeared to be normal in color and consistency.  She developed a bilateral lower leg rash which started again.  She had this before when her colitis was active.  Her dog licks her legs, and she is wondering if this might be poison ivy.  She said the only thing that helps her is hot water applied to her lower legs.

## 2023-05-19 ENCOUNTER — APPOINTMENT (OUTPATIENT)
Dept: INTERNAL MEDICINE | Facility: CLINIC | Age: 68
End: 2023-05-19
Payer: MEDICARE

## 2023-05-19 VITALS
HEART RATE: 89 BPM | BODY MASS INDEX: 36.73 KG/M2 | WEIGHT: 234 LBS | SYSTOLIC BLOOD PRESSURE: 167 MMHG | RESPIRATION RATE: 17 BRPM | OXYGEN SATURATION: 98 % | DIASTOLIC BLOOD PRESSURE: 86 MMHG | TEMPERATURE: 97.6 F | HEIGHT: 67 IN

## 2023-05-19 VITALS — DIASTOLIC BLOOD PRESSURE: 82 MMHG | SYSTOLIC BLOOD PRESSURE: 155 MMHG

## 2023-05-19 DIAGNOSIS — L23.7 ALLERGIC CONTACT DERMATITIS DUE TO PLANTS, EXCEPT FOOD: ICD-10-CM

## 2023-05-19 DIAGNOSIS — K46.9 UNSPECIFIED ABDOMINAL HERNIA W/OUT OBSTRUCTION OR GANGRENE: ICD-10-CM

## 2023-05-19 DIAGNOSIS — C54.1 MALIGNANT NEOPLASM OF ENDOMETRIUM: ICD-10-CM

## 2023-05-19 DIAGNOSIS — E66.9 OBESITY, UNSPECIFIED: ICD-10-CM

## 2023-05-19 DIAGNOSIS — D75.0 FAMILIAL ERYTHROCYTOSIS: ICD-10-CM

## 2023-05-19 PROCEDURE — G0438: CPT

## 2023-05-22 DIAGNOSIS — H61.23 IMPACTED CERUMEN, BILATERAL: ICD-10-CM

## 2023-05-22 LAB
25(OH)D3 SERPL-MCNC: 25.3 NG/ML
ALBUMIN SERPL ELPH-MCNC: 4.5 G/DL
ALP BLD-CCNC: 45 U/L
ALT SERPL-CCNC: 13 U/L
ANION GAP SERPL CALC-SCNC: 15 MMOL/L
AST SERPL-CCNC: 17 U/L
BILIRUB SERPL-MCNC: 0.3 MG/DL
BUN SERPL-MCNC: 33 MG/DL
CALCIUM SERPL-MCNC: 9.8 MG/DL
CHLORIDE SERPL-SCNC: 106 MMOL/L
CHOLEST SERPL-MCNC: 202 MG/DL
CO2 SERPL-SCNC: 21 MMOL/L
CREAT SERPL-MCNC: 0.94 MG/DL
CRP SERPL-MCNC: 9 MG/L
EGFR: 67 ML/MIN/1.73M2
ERYTHROCYTE [SEDIMENTATION RATE] IN BLOOD BY WESTERGREN METHOD: 66 MM/HR
ESTIMATED AVERAGE GLUCOSE: 120 MG/DL
FERRITIN SERPL-MCNC: 49 NG/ML
GLUCOSE SERPL-MCNC: 91 MG/DL
HBA1C MFR BLD HPLC: 5.8 %
HDLC SERPL-MCNC: 52 MG/DL
IRON SATN MFR SERPL: 14 %
IRON SERPL-MCNC: 50 UG/DL
LDLC SERPL CALC-MCNC: 128 MG/DL
NONHDLC SERPL-MCNC: 150 MG/DL
POTASSIUM SERPL-SCNC: 4.2 MMOL/L
PROT SERPL-MCNC: 8.3 G/DL
SODIUM SERPL-SCNC: 141 MMOL/L
TIBC SERPL-MCNC: 364 UG/DL
TRIGL SERPL-MCNC: 114 MG/DL
TSH SERPL-ACNC: 1.23 UIU/ML
UIBC SERPL-MCNC: 314 UG/DL
VIT B12 SERPL-MCNC: 547 PG/ML

## 2023-05-22 RX ORDER — ASPIRIN 81 MG
6.5 TABLET, DELAYED RELEASE (ENTERIC COATED) ORAL
Qty: 1 | Refills: 0 | Status: ACTIVE | COMMUNITY
Start: 2023-05-22 | End: 1900-01-01

## 2023-06-02 NOTE — ADDENDUM
[FreeTextEntry1] : change in thyroid nodules. discused with pt. she will fu endocrinologist. referral sent. spoke w dr ramirez who got pt in earlier.

## 2023-06-02 NOTE — ASSESSMENT
[FreeTextEntry1] : 1. dermatitis improved with vaseline try steroid ointment\par 2. UC saw GI has appt for colo. cont mesalamine. bms improved due for dexa. declines pcv. \par 3. hm due for mammo has appt,due for colo, utd covid plus booster. shingrix utd. no more paps.  due for dental ophtho derm \par 4. bp cont monitoring at home \par 5. thyroid nodule utd endocrinologist nodule due for f/u u/s\par 6. Obesity disucsesd diet/exercise\par 7. Endometrial ca  due for dr melendez f/u

## 2023-06-02 NOTE — HISTORY OF PRESENT ILLNESS
[de-identified] : 1. dermatitis improving with vaseline, less warm water has steroid cream as needed\par 2. UC saw GI has appt for colo. started mesalamine. bms have been improved past 3m. due for dexa. declines pcv. \par 3. hm due for mammo has appt,due for colo, utd covid plus booster. shingrix utd. no more paps. sleeps fine. eyes ears skin fine \par 4. bp checks at home runs 110s/60s notes long hx white coat htn exercises daily up to 2mi walking dog no cp sob edema\par 5. thyroid nodule utd endocrinologist nodule due for f/u \par 6. Obesity does lots of physical activity and feels good\par 7. Endometrial ca = due for dr melendez f/u \par 10 point review of systems reviewed and negative except as above

## 2023-06-02 NOTE — PHYSICAL EXAM
[No Acute Distress] : no acute distress [Well Nourished] : well nourished [Well Developed] : well developed [Well-Appearing] : well-appearing [Normal Sclera/Conjunctiva] : normal sclera/conjunctiva [PERRL] : pupils equal round and reactive to light [EOMI] : extraocular movements intact [Normal Outer Ear/Nose] : the outer ears and nose were normal in appearance [Normal Oropharynx] : the oropharynx was normal [No JVD] : no jugular venous distention [No Lymphadenopathy] : no lymphadenopathy [Supple] : supple [Thyroid Normal, No Nodules] : the thyroid was normal and there were no nodules present [No Respiratory Distress] : no respiratory distress  [No Accessory Muscle Use] : no accessory muscle use [Clear to Auscultation] : lungs were clear to auscultation bilaterally [Normal Rate] : normal rate  [Regular Rhythm] : with a regular rhythm [Normal S1, S2] : normal S1 and S2 [No Murmur] : no murmur heard [No Carotid Bruits] : no carotid bruits [No Abdominal Bruit] : a ~M bruit was not heard ~T in the abdomen [No Varicosities] : no varicosities [Pedal Pulses Present] : the pedal pulses are present [No Edema] : there was no peripheral edema [No Palpable Aorta] : no palpable aorta [No Extremity Clubbing/Cyanosis] : no extremity clubbing/cyanosis [Normal Appearance] : normal in appearance [No Nipple Discharge] : no nipple discharge [No Axillary Lymphadenopathy] : no axillary lymphadenopathy [Soft] : abdomen soft [Non Tender] : non-tender [Non-distended] : non-distended [No Masses] : no abdominal mass palpated [No HSM] : no HSM [Normal Bowel Sounds] : normal bowel sounds [Normal Posterior Cervical Nodes] : no posterior cervical lymphadenopathy [Normal Anterior Cervical Nodes] : no anterior cervical lymphadenopathy [No CVA Tenderness] : no CVA  tenderness [No Spinal Tenderness] : no spinal tenderness [No Joint Swelling] : no joint swelling [Grossly Normal Strength/Tone] : grossly normal strength/tone [Coordination Grossly Intact] : coordination grossly intact [No Focal Deficits] : no focal deficits [Normal Gait] : normal gait [Deep Tendon Reflexes (DTR)] : deep tendon reflexes were 2+ and symmetric [Normal Affect] : the affect was normal [Normal Mood] : the mood was normal [Normal Insight/Judgement] : insight and judgment were intact [de-identified] : sl erythema bl ankles

## 2023-08-25 ENCOUNTER — APPOINTMENT (OUTPATIENT)
Dept: GASTROENTEROLOGY | Facility: CLINIC | Age: 68
End: 2023-08-25
Payer: MEDICARE

## 2023-08-25 ENCOUNTER — LABORATORY RESULT (OUTPATIENT)
Age: 68
End: 2023-08-25

## 2023-08-25 VITALS — SYSTOLIC BLOOD PRESSURE: 180 MMHG | DIASTOLIC BLOOD PRESSURE: 75 MMHG | HEART RATE: 89 BPM | OXYGEN SATURATION: 100 %

## 2023-08-25 PROCEDURE — 99212 OFFICE O/P EST SF 10 MIN: CPT | Mod: 25

## 2023-08-25 PROCEDURE — 45380 COLONOSCOPY AND BIOPSY: CPT | Mod: 59

## 2023-08-25 NOTE — ASSESSMENT
[FreeTextEntry1] : 1. Ulcerative colitis (pancolitis) diagnosed on colonoscopy in April 2022-despite the fact that the patient is improved with regard to colitis, the mucopurulent stool may portend increased activity 2. Anemia, likely due to ulcerative colitis. 3. History of tubular adenoma, diverticulosis, hemorrhoids seen on colonoscopy in November 2018. 4. Esophagitis, hiatus hernia, and peptic duodenitis seen on EGD in April 2022. 5. Obesity. 6. Thyroid nodules. 7. History of endometrial adenocarcinoma status post MYRTLE (no radiation or chemo). 8. Large abdominal hernia. 9.  Bilateral lower extremity rash-May be related to contact dermatitis such as poison ivy, rule out secondary to colitis.  Plan: 1.  Continue mesalamine 0.375 mg, #4 pills once daily. 2.  Patient medically optimized, proceed to colonoscopy as previously scheduled.

## 2023-08-25 NOTE — PHYSICAL EXAM
[Alert] : alert [No Acute Distress] : no acute distress [Sclera] : the sclera and conjunctiva were normal [No Respiratory Distress] : no respiratory distress [Auscultation Breath Sounds / Voice Sounds] : lungs were clear to auscultation bilaterally [Heart Rate And Rhythm] : heart rate was normal and rhythm regular [Normal S1, S2] : normal S1 and S2 [Bowel Sounds] : normal bowel sounds [Abdomen Soft] : soft [No Focal Deficits] : no focal deficits [Oriented To Time, Place, And Person] : oriented to person, place, and time

## 2023-08-25 NOTE — HISTORY OF PRESENT ILLNESS
[FreeTextEntry1] : Vera presents to the office today to undergo a surveillance colonoscopy.  She was last seen in the office in May 2023 by Dr. Orellana.  Since her last visit, she has continued to feel well on mesalamine.  She usually has 2 formed stools in the morning without any bleeding.  She denies any urgency or nocturnal diarrhea and has been able to function normally.  The rash on her lower extremities is due to stasis dermatitis and had improved without any specific treatment.

## 2023-09-14 DIAGNOSIS — K51.90 ULCERATIVE COLITIS, UNSPECIFIED, W/OUT COMPLICATIONS: ICD-10-CM

## 2023-09-14 RX ORDER — MESALAMINE 1000 MG/1
1000 SUPPOSITORY RECTAL
Qty: 30 | Refills: 3 | Status: ACTIVE | COMMUNITY
Start: 2023-09-14 | End: 1900-01-01

## 2023-09-14 RX ORDER — MESALAMINE 0.38 G/1
0.38 CAPSULE, EXTENDED RELEASE ORAL DAILY
Qty: 360 | Refills: 3 | Status: ACTIVE | COMMUNITY
Start: 2022-04-11 | End: 1900-01-01

## 2023-09-15 ENCOUNTER — NON-APPOINTMENT (OUTPATIENT)
Age: 68
End: 2023-09-15

## 2023-09-21 ENCOUNTER — APPOINTMENT (OUTPATIENT)
Dept: ENDOCRINOLOGY | Facility: CLINIC | Age: 68
End: 2023-09-21
Payer: MEDICARE

## 2023-09-21 VITALS
HEIGHT: 67 IN | WEIGHT: 235 LBS | SYSTOLIC BLOOD PRESSURE: 180 MMHG | HEART RATE: 103 BPM | DIASTOLIC BLOOD PRESSURE: 100 MMHG | OXYGEN SATURATION: 99 % | BODY MASS INDEX: 36.88 KG/M2

## 2023-09-21 PROCEDURE — 99204 OFFICE O/P NEW MOD 45 MIN: CPT

## 2023-09-22 DIAGNOSIS — E04.1 NONTOXIC SINGLE THYROID NODULE: ICD-10-CM

## 2023-10-16 NOTE — REASON FOR VISIT
CKD (chronic kidney disease) [Follow-Up: _____] : a [unfilled] follow-up visit [FreeTextEntry1] : bloody diarrhea, gas

## 2024-03-20 ENCOUNTER — APPOINTMENT (OUTPATIENT)
Dept: ENDOCRINOLOGY | Facility: CLINIC | Age: 69
End: 2024-03-20

## 2024-04-21 ENCOUNTER — NON-APPOINTMENT (OUTPATIENT)
Age: 69
End: 2024-04-21

## 2024-04-22 DIAGNOSIS — Z00.00 ENCOUNTER FOR GENERAL ADULT MEDICAL EXAMINATION W/OUT ABNORMAL FINDINGS: ICD-10-CM

## 2024-09-19 ENCOUNTER — APPOINTMENT (OUTPATIENT)
Dept: DERMATOLOGY | Facility: CLINIC | Age: 69
End: 2024-09-19

## 2024-09-19 DIAGNOSIS — L30.9 DERMATITIS, UNSPECIFIED: ICD-10-CM

## 2024-09-19 PROCEDURE — G2211 COMPLEX E/M VISIT ADD ON: CPT

## 2024-09-19 PROCEDURE — 99204 OFFICE O/P NEW MOD 45 MIN: CPT

## 2024-09-19 RX ORDER — TRIAMCINOLONE ACETONIDE 1 MG/G
0.1 OINTMENT TOPICAL
Qty: 454 | Refills: 1 | Status: ACTIVE | COMMUNITY
Start: 2024-09-19 | End: 1900-01-01

## 2024-09-19 RX ORDER — CLOBETASOL PROPIONATE 0.5 MG/G
0.05 OINTMENT TOPICAL
Qty: 1 | Refills: 3 | Status: ACTIVE | COMMUNITY
Start: 2024-09-19 | End: 1900-01-01

## 2024-10-22 ENCOUNTER — APPOINTMENT (OUTPATIENT)
Dept: DERMATOLOGY | Facility: CLINIC | Age: 69
End: 2024-10-22
Payer: MEDICARE

## 2024-10-22 VITALS — BODY MASS INDEX: 36.88 KG/M2 | HEIGHT: 67 IN | WEIGHT: 235 LBS

## 2024-10-22 PROCEDURE — 99214 OFFICE O/P EST MOD 30 MIN: CPT | Mod: 25

## 2024-10-22 PROCEDURE — 17000 DESTRUCT PREMALG LESION: CPT

## 2024-10-22 PROCEDURE — 17003 DESTRUCT PREMALG LES 2-14: CPT

## 2024-11-01 ENCOUNTER — APPOINTMENT (OUTPATIENT)
Dept: INTERNAL MEDICINE | Facility: CLINIC | Age: 69
End: 2024-11-01
Payer: MEDICARE

## 2024-11-01 ENCOUNTER — LABORATORY RESULT (OUTPATIENT)
Age: 69
End: 2024-11-01

## 2024-11-01 VITALS
WEIGHT: 250 LBS | HEIGHT: 67 IN | BODY MASS INDEX: 39.24 KG/M2 | SYSTOLIC BLOOD PRESSURE: 187 MMHG | HEART RATE: 78 BPM | OXYGEN SATURATION: 98 % | TEMPERATURE: 98.5 F | DIASTOLIC BLOOD PRESSURE: 93 MMHG

## 2024-11-01 DIAGNOSIS — E04.1 NONTOXIC SINGLE THYROID NODULE: ICD-10-CM

## 2024-11-01 DIAGNOSIS — C54.1 MALIGNANT NEOPLASM OF ENDOMETRIUM: ICD-10-CM

## 2024-11-01 DIAGNOSIS — R21 RASH AND OTHER NONSPECIFIC SKIN ERUPTION: ICD-10-CM

## 2024-11-01 DIAGNOSIS — K51.90 ULCERATIVE COLITIS, UNSPECIFIED, W/OUT COMPLICATIONS: ICD-10-CM

## 2024-11-01 PROCEDURE — G0439: CPT

## 2024-11-01 PROCEDURE — 99214 OFFICE O/P EST MOD 30 MIN: CPT | Mod: 25

## 2024-11-04 LAB
25(OH)D3 SERPL-MCNC: 22.7 NG/ML
ALBUMIN SERPL ELPH-MCNC: 4.4 G/DL
ALP BLD-CCNC: 35 U/L
ALT SERPL-CCNC: 8 U/L
ANION GAP SERPL CALC-SCNC: 14 MMOL/L
APPEARANCE: CLEAR
AST SERPL-CCNC: 13 U/L
BILIRUB SERPL-MCNC: 0.2 MG/DL
BILIRUBIN URINE: NEGATIVE
BLOOD URINE: ABNORMAL
BUN SERPL-MCNC: 39 MG/DL
CALCIUM SERPL-MCNC: 9.6 MG/DL
CHLORIDE SERPL-SCNC: 106 MMOL/L
CHOLEST SERPL-MCNC: 203 MG/DL
CO2 SERPL-SCNC: 21 MMOL/L
COLOR: YELLOW
CREAT SERPL-MCNC: 1 MG/DL
EGFR: 62 ML/MIN/1.73M2
ESTIMATED AVERAGE GLUCOSE: 126 MG/DL
FERRITIN SERPL-MCNC: 32 NG/ML
GLUCOSE QUALITATIVE U: NEGATIVE MG/DL
GLUCOSE SERPL-MCNC: 91 MG/DL
HBA1C MFR BLD HPLC: 6 %
HCT VFR BLD CALC: 42.3 %
HDLC SERPL-MCNC: 51 MG/DL
HGB BLD-MCNC: 12.7 G/DL
IRON SATN MFR SERPL: 11 %
IRON SERPL-MCNC: 46 UG/DL
KETONES URINE: NEGATIVE MG/DL
LDLC SERPL CALC-MCNC: 126 MG/DL
LEUKOCYTE ESTERASE URINE: ABNORMAL
MCHC RBC-ENTMCNC: 25.9 PG
MCHC RBC-ENTMCNC: 30 G/DL
MCV RBC AUTO: 86.2 FL
NITRITE URINE: NEGATIVE
NONHDLC SERPL-MCNC: 153 MG/DL
PH URINE: 5.5
PLATELET # BLD AUTO: 357 K/UL
POTASSIUM SERPL-SCNC: 3.9 MMOL/L
PROT SERPL-MCNC: 7.9 G/DL
PROTEIN URINE: 30 MG/DL
RBC # BLD: 4.91 M/UL
RBC # FLD: 16.4 %
SODIUM SERPL-SCNC: 140 MMOL/L
SPECIFIC GRAVITY URINE: 1.03
TIBC SERPL-MCNC: 404 UG/DL
TRIGL SERPL-MCNC: 151 MG/DL
TSH SERPL-ACNC: 1.3 UIU/ML
UIBC SERPL-MCNC: 358 UG/DL
UROBILINOGEN URINE: 0.2 MG/DL
VIT B12 SERPL-MCNC: 528 PG/ML
WBC # FLD AUTO: 8.65 K/UL

## 2024-11-18 DIAGNOSIS — K51.90 ULCERATIVE COLITIS, UNSPECIFIED, W/OUT COMPLICATIONS: ICD-10-CM

## 2024-11-18 RX ORDER — FERROUS SULFATE 325(65) MG
325 (65 FE) TABLET ORAL DAILY
Qty: 90 | Refills: 0 | Status: ACTIVE | COMMUNITY
Start: 2024-11-18 | End: 1900-01-01

## 2024-12-03 ENCOUNTER — APPOINTMENT (OUTPATIENT)
Dept: DERMATOLOGY | Facility: CLINIC | Age: 69
End: 2024-12-03

## 2025-01-21 DIAGNOSIS — D75.0 FAMILIAL ERYTHROCYTOSIS: ICD-10-CM

## 2025-02-06 DIAGNOSIS — K29.70 GASTRITIS, UNSPECIFIED, W/OUT BLEEDING: ICD-10-CM

## 2025-05-01 DIAGNOSIS — K29.70 GASTRITIS, UNSPECIFIED, W/OUT BLEEDING: ICD-10-CM

## 2025-05-01 DIAGNOSIS — Z00.00 ENCOUNTER FOR GENERAL ADULT MEDICAL EXAMINATION W/OUT ABNORMAL FINDINGS: ICD-10-CM
